# Patient Record
Sex: FEMALE | Race: WHITE | Employment: FULL TIME | URBAN - METROPOLITAN AREA
[De-identification: names, ages, dates, MRNs, and addresses within clinical notes are randomized per-mention and may not be internally consistent; named-entity substitution may affect disease eponyms.]

---

## 2017-01-25 ENCOUNTER — TELEPHONE (OUTPATIENT)
Dept: OBGYN CLINIC | Facility: CLINIC | Age: 42
End: 2017-01-25

## 2017-01-25 PROBLEM — N87.9 CERVICAL DYSPLASIA: Status: ACTIVE | Noted: 2017-01-25

## 2017-01-25 NOTE — TELEPHONE ENCOUNTER
PER PT STATE SHE HAS SURGERY SCHEDULE FOR TOMORROW / PT STATE SHE'S SPOTTING / WANT TO KNOW IF THAT'S OK / PLEASE ADVISE

## 2017-01-25 NOTE — TELEPHONE ENCOUNTER
PT CALLING BACK STTS SHE MIGHT HAVE TO CANCEL HER SURGERY FOR TOMORROW , AS THE CONDITION IS GETTING WORSE , PLS

## 2017-01-25 NOTE — TELEPHONE ENCOUNTER
STATES SHE HAS BEEN SPOTTING FOR 5 DAYS. ONLY NEEDS A PANTY LINER. STATES SHE HAS BEEN BLEEDING FOR A FULL MONTH EVERY OTHER MONTH BUT IS SPOTTING THIS MONTH. ADVISED TO CALL TOMORROW MORNING BY 8:30AM WITH REPORT.   SHE IS AWARE IF BLEEDING ENOUGH TO US

## 2017-01-26 NOTE — TELEPHONE ENCOUNTER
C/O SPOTTING IS INCREASING AND IS NO LONGER COMFORTABLE WITH PANTY LINER ONLY. PASSED A FEW SMALL CLOTS YESTERDAY ALSO BUT NONE YET TODAY. WILL CHECK WITH JLK ABOUT SURGERY TODAY.

## 2017-01-26 NOTE — TELEPHONE ENCOUNTER
REVIEWED WITH CATRACHITO OVER THE PHONE AND SHE WOULD LIKE THIS CASE RESCHEDULED.  [facility]'S CHOICES ARE 2-7 (FIRST CASE), 2-14 (FIRST CASE) OR 2-16 IN THE AFTERNOON. PRE-OP RECOVERY NOTIFIED CASE CANCELLED FOR TODAY.   PT IS AWARE WE CANCELLED AND WILL CALL HER BACK

## 2017-02-07 ENCOUNTER — ANESTHESIA (OUTPATIENT)
Dept: SURGERY | Facility: HOSPITAL | Age: 42
End: 2017-02-07
Payer: COMMERCIAL

## 2017-02-07 ENCOUNTER — HOSPITAL ENCOUNTER (OUTPATIENT)
Facility: HOSPITAL | Age: 42
Setting detail: HOSPITAL OUTPATIENT SURGERY
Discharge: HOME | End: 2017-02-07
Attending: OBSTETRICS & GYNECOLOGY | Admitting: OBSTETRICS & GYNECOLOGY
Payer: COMMERCIAL

## 2017-02-07 ENCOUNTER — ANESTHESIA EVENT (OUTPATIENT)
Dept: SURGERY | Facility: HOSPITAL | Age: 42
End: 2017-02-07
Payer: COMMERCIAL

## 2017-02-07 ENCOUNTER — SURGERY (OUTPATIENT)
Age: 42
End: 2017-02-07

## 2017-02-07 VITALS
DIASTOLIC BLOOD PRESSURE: 76 MMHG | HEIGHT: 64 IN | WEIGHT: 206 LBS | RESPIRATION RATE: 16 BRPM | SYSTOLIC BLOOD PRESSURE: 114 MMHG | TEMPERATURE: 98 F | OXYGEN SATURATION: 97 % | BODY MASS INDEX: 35.17 KG/M2 | HEART RATE: 65 BPM

## 2017-02-07 DIAGNOSIS — D06.9 SEVERE CERVICAL DYSPLASIA: Primary | ICD-10-CM

## 2017-02-07 PROCEDURE — 57520 CONIZATION OF CERVIX: CPT | Performed by: OBSTETRICS & GYNECOLOGY

## 2017-02-07 PROCEDURE — 0UBC7ZX EXCISION OF CERVIX, VIA NATURAL OR ARTIFICIAL OPENING, DIAGNOSTIC: ICD-10-PCS | Performed by: OBSTETRICS & GYNECOLOGY

## 2017-02-07 RX ORDER — ONDANSETRON 4 MG/1
4 TABLET, FILM COATED ORAL EVERY 8 HOURS PRN
Status: CANCELLED | OUTPATIENT
Start: 2017-02-07

## 2017-02-07 RX ORDER — MIDAZOLAM HYDROCHLORIDE 1 MG/ML
INJECTION INTRAMUSCULAR; INTRAVENOUS AS NEEDED
Status: DISCONTINUED | OUTPATIENT
Start: 2017-02-07 | End: 2017-02-07 | Stop reason: SURG

## 2017-02-07 RX ORDER — DEXTROSE, SODIUM CHLORIDE, SODIUM LACTATE, POTASSIUM CHLORIDE, AND CALCIUM CHLORIDE 5; .6; .31; .03; .02 G/100ML; G/100ML; G/100ML; G/100ML; G/100ML
INJECTION, SOLUTION INTRAVENOUS CONTINUOUS
Status: CANCELLED | OUTPATIENT
Start: 2017-02-07

## 2017-02-07 RX ORDER — HYDROCODONE BITARTRATE AND ACETAMINOPHEN 5; 325 MG/1; MG/1
1 TABLET ORAL AS NEEDED
Status: COMPLETED | OUTPATIENT
Start: 2017-02-07 | End: 2017-02-07

## 2017-02-07 RX ORDER — IBUPROFEN 600 MG/1
600 TABLET ORAL EVERY 6 HOURS PRN
Status: DISCONTINUED | OUTPATIENT
Start: 2017-02-07 | End: 2017-02-07

## 2017-02-07 RX ORDER — SODIUM CHLORIDE, SODIUM LACTATE, POTASSIUM CHLORIDE, CALCIUM CHLORIDE 600; 310; 30; 20 MG/100ML; MG/100ML; MG/100ML; MG/100ML
INJECTION, SOLUTION INTRAVENOUS CONTINUOUS
Status: DISCONTINUED | OUTPATIENT
Start: 2017-02-07 | End: 2017-02-07

## 2017-02-07 RX ORDER — IODINE SOLUTION STRONG 5% (LUGOL'S) 5 %
SOLUTION ORAL AS NEEDED
Status: DISCONTINUED | OUTPATIENT
Start: 2017-02-07 | End: 2017-02-07 | Stop reason: HOSPADM

## 2017-02-07 RX ORDER — HYDROMORPHONE HYDROCHLORIDE 1 MG/ML
0.2 INJECTION, SOLUTION INTRAMUSCULAR; INTRAVENOUS; SUBCUTANEOUS EVERY 5 MIN PRN
Status: DISCONTINUED | OUTPATIENT
Start: 2017-02-07 | End: 2017-02-07

## 2017-02-07 RX ORDER — MORPHINE SULFATE 10 MG/ML
6 INJECTION, SOLUTION INTRAMUSCULAR; INTRAVENOUS EVERY 10 MIN PRN
Status: DISCONTINUED | OUTPATIENT
Start: 2017-02-07 | End: 2017-02-07

## 2017-02-07 RX ORDER — FAMOTIDINE 20 MG/1
20 TABLET ORAL ONCE
Status: COMPLETED | OUTPATIENT
Start: 2017-02-07 | End: 2017-02-07

## 2017-02-07 RX ORDER — LIDOCAINE HYDROCHLORIDE 10 MG/ML
INJECTION, SOLUTION EPIDURAL; INFILTRATION; INTRACAUDAL; PERINEURAL AS NEEDED
Status: DISCONTINUED | OUTPATIENT
Start: 2017-02-07 | End: 2017-02-07 | Stop reason: SURG

## 2017-02-07 RX ORDER — MORPHINE SULFATE 4 MG/ML
4 INJECTION, SOLUTION INTRAMUSCULAR; INTRAVENOUS EVERY 10 MIN PRN
Status: DISCONTINUED | OUTPATIENT
Start: 2017-02-07 | End: 2017-02-07

## 2017-02-07 RX ORDER — HYDROCODONE BITARTRATE AND ACETAMINOPHEN 5; 325 MG/1; MG/1
1 TABLET ORAL EVERY 4 HOURS PRN
Status: DISCONTINUED | OUTPATIENT
Start: 2017-02-07 | End: 2017-02-07

## 2017-02-07 RX ORDER — ONDANSETRON 2 MG/ML
4 INJECTION INTRAMUSCULAR; INTRAVENOUS ONCE AS NEEDED
Status: DISCONTINUED | OUTPATIENT
Start: 2017-02-07 | End: 2017-02-07

## 2017-02-07 RX ORDER — FERRIC SUBSULFATE 20-22G/100
SOLUTION, NON-ORAL MISCELLANEOUS AS NEEDED
Status: DISCONTINUED | OUTPATIENT
Start: 2017-02-07 | End: 2017-02-07 | Stop reason: HOSPADM

## 2017-02-07 RX ORDER — ONDANSETRON 2 MG/ML
INJECTION INTRAMUSCULAR; INTRAVENOUS AS NEEDED
Status: DISCONTINUED | OUTPATIENT
Start: 2017-02-07 | End: 2017-02-07 | Stop reason: SURG

## 2017-02-07 RX ORDER — METOCLOPRAMIDE 10 MG/1
10 TABLET ORAL ONCE
Status: COMPLETED | OUTPATIENT
Start: 2017-02-07 | End: 2017-02-07

## 2017-02-07 RX ORDER — HALOPERIDOL 5 MG/ML
0.25 INJECTION INTRAMUSCULAR ONCE AS NEEDED
Status: DISCONTINUED | OUTPATIENT
Start: 2017-02-07 | End: 2017-02-07

## 2017-02-07 RX ORDER — ACETIC ACID 0.25 G/100ML
IRRIGANT IRRIGATION AS NEEDED
Status: DISCONTINUED | OUTPATIENT
Start: 2017-02-07 | End: 2017-02-07 | Stop reason: HOSPADM

## 2017-02-07 RX ORDER — MORPHINE SULFATE 2 MG/ML
2 INJECTION, SOLUTION INTRAMUSCULAR; INTRAVENOUS EVERY 10 MIN PRN
Status: DISCONTINUED | OUTPATIENT
Start: 2017-02-07 | End: 2017-02-07

## 2017-02-07 RX ORDER — DEXAMETHASONE SODIUM PHOSPHATE 4 MG/ML
VIAL (ML) INJECTION AS NEEDED
Status: DISCONTINUED | OUTPATIENT
Start: 2017-02-07 | End: 2017-02-07 | Stop reason: SURG

## 2017-02-07 RX ORDER — LIDOCAINE HYDROCHLORIDE AND EPINEPHRINE 10; 10 MG/ML; UG/ML
INJECTION, SOLUTION INFILTRATION; PERINEURAL AS NEEDED
Status: DISCONTINUED | OUTPATIENT
Start: 2017-02-07 | End: 2017-02-07 | Stop reason: HOSPADM

## 2017-02-07 RX ORDER — NALOXONE HYDROCHLORIDE 0.4 MG/ML
80 INJECTION, SOLUTION INTRAMUSCULAR; INTRAVENOUS; SUBCUTANEOUS AS NEEDED
Status: DISCONTINUED | OUTPATIENT
Start: 2017-02-07 | End: 2017-02-07

## 2017-02-07 RX ORDER — HYDROMORPHONE HYDROCHLORIDE 1 MG/ML
0.6 INJECTION, SOLUTION INTRAMUSCULAR; INTRAVENOUS; SUBCUTANEOUS EVERY 5 MIN PRN
Status: DISCONTINUED | OUTPATIENT
Start: 2017-02-07 | End: 2017-02-07

## 2017-02-07 RX ORDER — HYDROMORPHONE HYDROCHLORIDE 1 MG/ML
0.4 INJECTION, SOLUTION INTRAMUSCULAR; INTRAVENOUS; SUBCUTANEOUS EVERY 5 MIN PRN
Status: DISCONTINUED | OUTPATIENT
Start: 2017-02-07 | End: 2017-02-07

## 2017-02-07 RX ORDER — ONDANSETRON 2 MG/ML
4 INJECTION INTRAMUSCULAR; INTRAVENOUS EVERY 8 HOURS PRN
Status: CANCELLED | OUTPATIENT
Start: 2017-02-07

## 2017-02-07 RX ORDER — KETOROLAC TROMETHAMINE 30 MG/ML
INJECTION, SOLUTION INTRAMUSCULAR; INTRAVENOUS AS NEEDED
Status: DISCONTINUED | OUTPATIENT
Start: 2017-02-07 | End: 2017-02-07 | Stop reason: SURG

## 2017-02-07 RX ORDER — HYDROCODONE BITARTRATE AND ACETAMINOPHEN 5; 325 MG/1; MG/1
2 TABLET ORAL AS NEEDED
Status: COMPLETED | OUTPATIENT
Start: 2017-02-07 | End: 2017-02-07

## 2017-02-07 RX ORDER — ACETAMINOPHEN 325 MG/1
650 TABLET ORAL ONCE
Status: COMPLETED | OUTPATIENT
Start: 2017-02-07 | End: 2017-02-07

## 2017-02-07 RX ADMIN — ONDANSETRON 4 MG: 2 INJECTION INTRAMUSCULAR; INTRAVENOUS at 10:03:00

## 2017-02-07 RX ADMIN — SODIUM CHLORIDE, SODIUM LACTATE, POTASSIUM CHLORIDE, CALCIUM CHLORIDE: 600; 310; 30; 20 INJECTION, SOLUTION INTRAVENOUS at 11:00:00

## 2017-02-07 RX ADMIN — SODIUM CHLORIDE, SODIUM LACTATE, POTASSIUM CHLORIDE, CALCIUM CHLORIDE: 600; 310; 30; 20 INJECTION, SOLUTION INTRAVENOUS at 09:56:00

## 2017-02-07 RX ADMIN — LIDOCAINE HYDROCHLORIDE 50 MG: 10 INJECTION, SOLUTION EPIDURAL; INFILTRATION; INTRACAUDAL; PERINEURAL at 09:57:00

## 2017-02-07 RX ADMIN — DEXAMETHASONE SODIUM PHOSPHATE 4 MG: 4 MG/ML VIAL (ML) INJECTION at 10:03:00

## 2017-02-07 RX ADMIN — SODIUM CHLORIDE, SODIUM LACTATE, POTASSIUM CHLORIDE, CALCIUM CHLORIDE: 600; 310; 30; 20 INJECTION, SOLUTION INTRAVENOUS at 10:45:00

## 2017-02-07 RX ADMIN — MIDAZOLAM HYDROCHLORIDE 2 MG: 1 INJECTION INTRAMUSCULAR; INTRAVENOUS at 09:56:00

## 2017-02-07 RX ADMIN — KETOROLAC TROMETHAMINE 30 MG: 30 INJECTION, SOLUTION INTRAMUSCULAR; INTRAVENOUS at 10:44:00

## 2017-02-07 NOTE — INTERVAL H&P NOTE
Pre-op Diagnosis: Severe cervical dysplasia     The above referenced H&P was reviewed by Tameka Bernard MD on 2/7/2017, the patient was examined and no significant changes have occurred in the patient's condition since the H&P was performed.   I discussed with

## 2017-02-07 NOTE — ANESTHESIA PREPROCEDURE EVALUATION
Anesthesia PreOp Note    HPI:     Jesse Hand is a 39year old female who presents for preoperative consultation requested by: Chad Almanza MD    Date of Surgery: 2/7/2017    Procedure(s):  CERVICAL CONE BIOPSY  Indication: Severe cervical dysplasia [OTHER] Paternal Grandfather 61     pancreatic       Social History   Marital Status: Single  Spouse Name: N/A    Years of Education: N/A  Number of Children: N/A     Occupational History  None on file     Social History Main Topics   Smoking status: Smoke have informed Robina Nohemi  of the nature of the anesthetic plan, benefits, risks, major complications, and any alternative forms of anesthetic management. All of the patient's questions were answered to the best of my ability.  The patient desires th

## 2017-02-07 NOTE — H&P
Pre-Operative History and Physical        HPI:  Cory Moeller is a 39year old  Patient's last menstrual period was 2017.  who presents for cold knife conization.  Had pap in 10/2016-- HGSIL with positive HPV.  Had colposcopy on 2016 [facility] benefits, and alternatives discussed with the patient.  Risks including, but not limited to bleeding, infection, anesth risks were reviewed.  All questions were answered.

## 2017-02-07 NOTE — DISCHARGE SUMMARY
[facility]    Outpatient Surgery Discharge Summary    Dougie Gilliland Patient Status:  Hospital Outpatient Surgery    1975 MRN [id number]   Location [facility] UNIT Attending Beck Nava MD   [address] [facility]

## 2017-02-07 NOTE — BRIEF OP NOTE
[facility] POST ANESTHESIA CARE UNIT  Brief Op Note     Nancy Mccarthy Location: OR   [facility] [id number] MRN [id number]   Admission Date 2/7/2017 Operation Date 2/7/2017   Attending Physician Ernestine Garrett MD Operating Physician Lisa Velasquez MD       Pre-Op

## 2017-02-07 NOTE — ANESTHESIA POSTPROCEDURE EVALUATION
Patient: Siddharth Osorio    Procedure Summary     Date Anesthesia Start Anesthesia Stop Room / Location    02/07/17 0956  [facility] MAIN OR 01 / [facility] MAIN OR       Procedure Diagnosis Surgeon Responsible Provider    CERVICAL CONE BIOPSY (N/A Vagina ) (Severe cer

## 2017-02-07 NOTE — H&P
Pre-Operative History and Physical        HPI:  Dougie Gilliland is a 39year old  Patient's last menstrual period was 2017.  who presents for cold knife conization.  Had pap in 10/2016-- HGSIL with positive HPV.  Had colposcopy on 2016 [facility] hair distribution, and no lesions  Urethral Meatus:  normal in size, location, without lesions and prolapse  Bladder:  No fullness, masses or tenderness  Vagina:  Normal appearance without lesions, no abnormal discharge  Cervix:  Normal without tenderness

## 2017-02-08 NOTE — OPERATIVE REPORT
[facility]    PATIENT'S NAME: Melany Patel   ATTENDING PHYSICIAN: Maylin Ford MD   OPERATING PHYSICIAN: Maylin Ford MD   PATIENT ACCOUNT#:   [de-identified]    LOCATION:  [facility] 10  MEDICAL RECORD #:   [id number]       DATE OF BIRTH MD  d: 02/07/2017 11:26:12  t: 02/07/2017 18:56:52  [facility] [id number]  CATRACHITO/

## 2017-02-14 ENCOUNTER — OFFICE VISIT (OUTPATIENT)
Dept: OBGYN CLINIC | Facility: CLINIC | Age: 42
End: 2017-02-14

## 2017-02-14 VITALS
WEIGHT: 208 LBS | SYSTOLIC BLOOD PRESSURE: 112 MMHG | BODY MASS INDEX: 36 KG/M2 | DIASTOLIC BLOOD PRESSURE: 76 MMHG | HEART RATE: 69 BPM

## 2017-02-14 DIAGNOSIS — Z98.890 POST-OPERATIVE STATE: Primary | ICD-10-CM

## 2017-02-14 PROCEDURE — 99024 POSTOP FOLLOW-UP VISIT: CPT | Performed by: OBSTETRICS & GYNECOLOGY

## 2017-02-14 NOTE — PROGRESS NOTES
Laurita Araya is a 39year old female [id number] Patient's last menstrual period was 01/06/2017. Patient presents with:  Gyn Problem: post-op f/u, cone biopsy    Here for post op exam.   Had cold knife cervical conization on 2/7/17.   Final pathology-- DARSHAN

## 2017-06-12 ENCOUNTER — TELEPHONE (OUTPATIENT)
Dept: OBGYN CLINIC | Facility: CLINIC | Age: 42
End: 2017-06-12

## 2017-06-12 NOTE — TELEPHONE ENCOUNTER
LMTCB. 11/2016 CARMENK stated that addl views and u/s - small breast cysts. Repeat left diag mamm in 6 months. It was due 5/2017. Need to know if pt is going to schedule it.

## 2017-06-26 NOTE — TELEPHONE ENCOUNTER
Pt advised of below and # for central scheduling given. Pt has no further questions, and states understanding.

## 2017-10-20 ENCOUNTER — OFFICE VISIT (OUTPATIENT)
Dept: OBGYN CLINIC | Facility: CLINIC | Age: 42
End: 2017-10-20

## 2017-10-20 VITALS
HEIGHT: 63.5 IN | SYSTOLIC BLOOD PRESSURE: 111 MMHG | WEIGHT: 221 LBS | DIASTOLIC BLOOD PRESSURE: 76 MMHG | HEART RATE: 69 BPM | BODY MASS INDEX: 38.67 KG/M2

## 2017-10-20 DIAGNOSIS — Z01.419 ENCOUNTER FOR GYNECOLOGICAL EXAMINATION: ICD-10-CM

## 2017-10-20 DIAGNOSIS — N92.6 PROLONGED PERIODS: Primary | ICD-10-CM

## 2017-10-20 DIAGNOSIS — N60.02 CYST OF LEFT BREAST: ICD-10-CM

## 2017-10-20 DIAGNOSIS — Z12.4 SCREENING FOR MALIGNANT NEOPLASM OF CERVIX: ICD-10-CM

## 2017-10-20 PROCEDURE — 99396 PREV VISIT EST AGE 40-64: CPT | Performed by: OBSTETRICS & GYNECOLOGY

## 2017-10-20 NOTE — PROGRESS NOTES
Patience Perez is a 43year old female [id number] Patient's last menstrual period was 10/04/2017. here for annual exam.       Last seen 2/14/17. Had cold knife cervical conization on 2/7/17.   Final pathology-- DARSHAN 2-3 with clear ectocervical and endocervic fatigue, night sweats, hot flashes  Eyes:  denies blurred or double vision  Cardiovascular:  denies chest pain or palpitations  Respiratory:  denies shortness of breath  Gastrointestinal:  denies heartburn, abdominal pain, diarrhea or constipation  Genitou Annual exams encouraged. RTC 1 year or prn    2. Prolonged periods  Order for pelvic u/s. Will also need e-bx.     3. Cyst of left breast  Order for bilateral mammogram.    4.  Cold knife conization of cervix 2/2017      No prescriptions requested or ord

## 2017-11-03 ENCOUNTER — HOSPITAL ENCOUNTER (OUTPATIENT)
Dept: MAMMOGRAPHY | Facility: HOSPITAL | Age: 42
Discharge: HOME | End: 2017-11-03
Attending: OBSTETRICS & GYNECOLOGY
Payer: COMMERCIAL

## 2017-11-03 ENCOUNTER — HOSPITAL ENCOUNTER (OUTPATIENT)
Dept: ULTRASOUND IMAGING | Facility: HOSPITAL | Age: 42
Discharge: HOME | End: 2017-11-03
Attending: OBSTETRICS & GYNECOLOGY
Payer: COMMERCIAL

## 2017-11-03 DIAGNOSIS — N92.6 PROLONGED PERIODS: ICD-10-CM

## 2017-11-03 DIAGNOSIS — N60.02 CYST OF LEFT BREAST: ICD-10-CM

## 2017-11-03 PROCEDURE — 93975 VASCULAR STUDY: CPT | Performed by: OBSTETRICS & GYNECOLOGY

## 2017-11-03 PROCEDURE — 77066 DX MAMMO INCL CAD BI: CPT | Performed by: OBSTETRICS & GYNECOLOGY

## 2017-11-03 PROCEDURE — 76830 TRANSVAGINAL US NON-OB: CPT | Performed by: OBSTETRICS & GYNECOLOGY

## 2017-11-03 PROCEDURE — 76856 US EXAM PELVIC COMPLETE: CPT | Performed by: OBSTETRICS & GYNECOLOGY

## 2017-11-10 ENCOUNTER — TELEPHONE (OUTPATIENT)
Dept: OBGYN CLINIC | Facility: CLINIC | Age: 42
End: 2017-11-10

## 2017-11-14 NOTE — TELEPHONE ENCOUNTER
Sent message via My Chart regarding pelvic u/s-- fibroids.   Needs e-bx if bleeding continues to be abnormal.

## 2017-11-29 ENCOUNTER — TELEPHONE (OUTPATIENT)
Dept: OBGYN CLINIC | Facility: CLINIC | Age: 42
End: 2017-11-29

## 2017-11-29 NOTE — TELEPHONE ENCOUNTER
Pt informed of below and verbalized understanding. Today is day 1, she will call if bleeding persists for 7 days.

## 2017-11-29 NOTE — TELEPHONE ENCOUNTER
Ade Moran MD      3:33 PM   Note      Sent message via My Chart regarding pelvic u/s-- fibroids.   Needs e-bx if bleeding continues to be abnormal.          Notes Recorded by Ade Moran MD on 10/27/2017 at 2:41 PM CDT  Normal pap and negative HPV.  But r

## 2018-05-16 ENCOUNTER — TELEPHONE (OUTPATIENT)
Dept: FAMILY MEDICINE CLINIC | Facility: CLINIC | Age: 43
End: 2018-05-16

## 2018-05-16 NOTE — TELEPHONE ENCOUNTER
Pt called in requesting to have follow up lab orders placed on to her chart for the following:    Lipid Panel   Thyroid   Comp Metabolic Panel

## 2018-05-17 ENCOUNTER — OFFICE VISIT (OUTPATIENT)
Dept: FAMILY MEDICINE CLINIC | Facility: CLINIC | Age: 43
End: 2018-05-17

## 2018-05-17 VITALS
WEIGHT: 237 LBS | HEART RATE: 87 BPM | RESPIRATION RATE: 20 BRPM | TEMPERATURE: 98 F | BODY MASS INDEX: 40.46 KG/M2 | DIASTOLIC BLOOD PRESSURE: 75 MMHG | SYSTOLIC BLOOD PRESSURE: 116 MMHG | HEIGHT: 64 IN

## 2018-05-17 DIAGNOSIS — Z00.00 ROUTINE PHYSICAL EXAMINATION: Primary | ICD-10-CM

## 2018-05-17 PROCEDURE — 99396 PREV VISIT EST AGE 40-64: CPT | Performed by: FAMILY MEDICINE

## 2018-05-17 NOTE — TELEPHONE ENCOUNTER
Patient has never been seen by [facility]. Appt will be needed to establish care for a px. NINA, please schedule an appt for patient. Orders will be given at the time of appt.

## 2018-05-17 NOTE — PROGRESS NOTES
HPI:    Patient ID: Marcelo Oquendo is a 37year old female. Patient is here for routine physical exam. No acute issues. No significant chronic medical problems. Patient is requesting blood testing.  Diet and exercise have been fair Past medical history Psychiatric: She has a normal mood and affect. Her behavior is normal. Judgment and thought content normal.   Vitals reviewed. ASSESSMENT/PLAN:   Routine physical examination: with some recent fatigue:  - Exam is unremarkable.  Screening tests

## 2018-05-19 ENCOUNTER — APPOINTMENT (OUTPATIENT)
Dept: LAB | Facility: HOSPITAL | Age: 43
End: 2018-05-19
Attending: FAMILY MEDICINE
Payer: COMMERCIAL

## 2018-05-19 DIAGNOSIS — Z00.00 ROUTINE PHYSICAL EXAMINATION: ICD-10-CM

## 2018-05-19 PROCEDURE — 84443 ASSAY THYROID STIM HORMONE: CPT

## 2018-05-19 PROCEDURE — 36415 COLL VENOUS BLD VENIPUNCTURE: CPT

## 2018-05-19 PROCEDURE — 80053 COMPREHEN METABOLIC PANEL: CPT

## 2018-05-19 PROCEDURE — 80061 LIPID PANEL: CPT

## 2018-05-19 PROCEDURE — 85027 COMPLETE CBC AUTOMATED: CPT

## 2019-03-04 ENCOUNTER — HOSPITAL ENCOUNTER (OUTPATIENT)
Dept: GENERAL RADIOLOGY | Age: 44
Discharge: HOME | End: 2019-03-04
Attending: FAMILY MEDICINE
Payer: COMMERCIAL

## 2019-03-04 ENCOUNTER — OFFICE VISIT (OUTPATIENT)
Dept: FAMILY MEDICINE CLINIC | Facility: CLINIC | Age: 44
End: 2019-03-04
Payer: COMMERCIAL

## 2019-03-04 VITALS
SYSTOLIC BLOOD PRESSURE: 125 MMHG | HEART RATE: 78 BPM | BODY MASS INDEX: 41.66 KG/M2 | WEIGHT: 241 LBS | RESPIRATION RATE: 18 BRPM | DIASTOLIC BLOOD PRESSURE: 88 MMHG | HEIGHT: 63.9 IN | TEMPERATURE: 98 F

## 2019-03-04 DIAGNOSIS — M25.552 PAIN OF BOTH HIP JOINTS: ICD-10-CM

## 2019-03-04 DIAGNOSIS — M25.551 PAIN OF BOTH HIP JOINTS: ICD-10-CM

## 2019-03-04 PROCEDURE — 99212 OFFICE O/P EST SF 10 MIN: CPT | Performed by: FAMILY MEDICINE

## 2019-03-04 PROCEDURE — 99213 OFFICE O/P EST LOW 20 MIN: CPT | Performed by: FAMILY MEDICINE

## 2019-03-04 PROCEDURE — 73502 X-RAY EXAM HIP UNI 2-3 VIEWS: CPT | Performed by: FAMILY MEDICINE

## 2019-03-04 RX ORDER — IBUPROFEN 200 MG
400 TABLET ORAL EVERY 6 HOURS PRN
COMMUNITY
End: 2019-05-28

## 2019-03-04 NOTE — PROGRESS NOTES
HPI:    Patient ID: Laurita Araya is a 40year old female. Pt presents with pain of the hips - worse on the right side. Pt has had intermittent symptoms for 6 months and worse over the last 2 months. No injury or trauma.  Pt has pain of the anterior a

## 2019-03-13 ENCOUNTER — OFFICE VISIT (OUTPATIENT)
Dept: NEUROLOGY | Facility: CLINIC | Age: 44
End: 2019-03-13
Payer: COMMERCIAL

## 2019-03-13 ENCOUNTER — TELEPHONE (OUTPATIENT)
Dept: NEUROLOGY | Facility: CLINIC | Age: 44
End: 2019-03-13

## 2019-03-13 VITALS
SYSTOLIC BLOOD PRESSURE: 116 MMHG | DIASTOLIC BLOOD PRESSURE: 78 MMHG | HEART RATE: 88 BPM | WEIGHT: 240 LBS | HEIGHT: 64 IN | BODY MASS INDEX: 40.97 KG/M2

## 2019-03-13 DIAGNOSIS — R29.3 POOR POSTURE: ICD-10-CM

## 2019-03-13 DIAGNOSIS — M16.0 PRIMARY OSTEOARTHRITIS OF BOTH HIPS: Primary | ICD-10-CM

## 2019-03-13 DIAGNOSIS — E66.01 CLASS 3 SEVERE OBESITY WITH BODY MASS INDEX (BMI) OF 40.0 TO 44.9 IN ADULT, UNSPECIFIED OBESITY TYPE, UNSPECIFIED WHETHER SERIOUS COMORBIDITY PRESENT (HCC): ICD-10-CM

## 2019-03-13 DIAGNOSIS — G47.9 SLEEP DISTURBANCE: ICD-10-CM

## 2019-03-13 DIAGNOSIS — M62.9 HAMSTRING TIGHTNESS OF BOTH LOWER EXTREMITIES: ICD-10-CM

## 2019-03-13 PROBLEM — E66.813 CLASS 3 SEVERE OBESITY WITH BODY MASS INDEX (BMI) OF 40.0 TO 44.9 IN ADULT (HCC): Status: ACTIVE | Noted: 2019-03-13

## 2019-03-13 PROBLEM — E66.813 CLASS 3 SEVERE OBESITY WITH BODY MASS INDEX (BMI) OF 40.0 TO 44.9 IN ADULT: Status: ACTIVE | Noted: 2019-03-13

## 2019-03-13 PROCEDURE — 99244 OFF/OP CNSLTJ NEW/EST MOD 40: CPT | Performed by: PHYSICAL MEDICINE & REHABILITATION

## 2019-03-13 RX ORDER — MELOXICAM 15 MG/1
15 TABLET ORAL DAILY
Qty: 14 TABLET | Refills: 0 | Status: SHIPPED | OUTPATIENT
Start: 2019-03-13 | End: 2019-03-27

## 2019-03-13 NOTE — TELEPHONE ENCOUNTER
Called [facility] BS for authorization of approval of Ultrasound guided CSI bilateral hips cpt codes 12077, [id number], . Talked to Sherine Drummond. who states no authorization is required. Reference # [id number]. Will inform Nursing.

## 2019-03-13 NOTE — PATIENT INSTRUCTIONS
-Start physical therapy  -Home exercises as advised by PT  -Mobic daily for the next 2 weeks and then as needed  -Dietician consult for weight management  -Follow up with me in 3 weeks  -If no better will consider hip joint injections

## 2019-03-13 NOTE — PROGRESS NOTES
[address] [facility]  NEW PATIENT EVALUATION    Consultation as a request of Dr. Caden Chowdhury    Chief Complaint: bilateral hip pain.     HISTORY OF PRESENT ILLNESS:   Patient presents with:  Hip Pain: Patient presents today c/ Ibuprofen    Work Related: No  Occupation:full time job doing   Activity level: Likes to golf and walk daily        PAST MEDICAL HISTORY:     Past Medical History:   Diagnosis Date   • PONV (postoperative nausea and vomiting)    • Torn men pain, dyspnea, exertional chest pressure/discomfort, orthopnea and paroxysmal nocturnal dyspnea  Gastrointestinal: negative for abdominal pain, constipation and diarrhea  Genitourinary:negative for dysuria, frequency and urinary incontinence  Hematologic/l LUMBAR SPINE:  Inspection: no erythema, swelling, or obvious deformity.   Their iliac crest and shoulder heights are symmetrical.     Palpation: No TTP spinous process, paraspinal muscles, SI joints, prirformis muscle, glut's, greater trochanter bursa 1. Primary osteoarthritis of both hips    2. Poor posture    3. Class 3 severe obesity with body mass index (BMI) of 40.0 to 44.9 in adult, unspecified obesity type, unspecified whether serious comorbidity present ([address]    4.  Hamstring tightness of bot

## 2019-03-20 ENCOUNTER — MED REC SCAN ONLY (OUTPATIENT)
Dept: NEUROLOGY | Facility: CLINIC | Age: 44
End: 2019-03-20

## 2019-04-03 ENCOUNTER — OFFICE VISIT (OUTPATIENT)
Dept: NEUROLOGY | Facility: CLINIC | Age: 44
End: 2019-04-03
Payer: COMMERCIAL

## 2019-04-03 VITALS
HEART RATE: 88 BPM | DIASTOLIC BLOOD PRESSURE: 74 MMHG | HEIGHT: 64 IN | BODY MASS INDEX: 40.12 KG/M2 | SYSTOLIC BLOOD PRESSURE: 116 MMHG | WEIGHT: 235 LBS

## 2019-04-03 DIAGNOSIS — R29.3 POOR POSTURE: ICD-10-CM

## 2019-04-03 DIAGNOSIS — E66.01 CLASS 3 SEVERE OBESITY WITH BODY MASS INDEX (BMI) OF 40.0 TO 44.9 IN ADULT, UNSPECIFIED OBESITY TYPE, UNSPECIFIED WHETHER SERIOUS COMORBIDITY PRESENT (HCC): ICD-10-CM

## 2019-04-03 DIAGNOSIS — M16.0 PRIMARY OSTEOARTHRITIS OF BOTH HIPS: Primary | ICD-10-CM

## 2019-04-03 DIAGNOSIS — M62.9 HAMSTRING TIGHTNESS OF BOTH LOWER EXTREMITIES: ICD-10-CM

## 2019-04-03 PROCEDURE — 20611 DRAIN/INJ JOINT/BURSA W/US: CPT | Performed by: PHYSICAL MEDICINE & REHABILITATION

## 2019-04-03 PROCEDURE — 99214 OFFICE O/P EST MOD 30 MIN: CPT | Performed by: PHYSICAL MEDICINE & REHABILITATION

## 2019-04-03 RX ORDER — TRIAMCINOLONE ACETONIDE 40 MG/ML
40 INJECTION, SUSPENSION INTRA-ARTICULAR; INTRAMUSCULAR ONCE
Status: COMPLETED | OUTPATIENT
Start: 2019-04-03 | End: 2019-04-03

## 2019-04-03 RX ORDER — LIDOCAINE HYDROCHLORIDE 10 MG/ML
4 INJECTION, SOLUTION INFILTRATION; PERINEURAL ONCE
Status: COMPLETED | OUTPATIENT
Start: 2019-04-03 | End: 2019-04-03

## 2019-04-03 NOTE — PROGRESS NOTES
[address] [facility]  NEW PATIENT EVALUATION    Chief Complaint: bilateral hip pain. HISTORY OF PRESENT ILLNESS:   Patient presents with:  Hip Pain: LOV: 3/13/19. F/U on bilateral hip pain.  Patient states that she murphy         CURRENT MEDICATIONS:       Current Outpatient Medications:  ibuprofen (ADVIL) 200 MG Oral Tab Take 400 mg by mouth every 6 (six) hours as needed for Pain.  Disp:  Rfl:          ALLERGIES:   No Known Allergies      FAMILY HISTORY:     [place] Patient Position: Sitting, Cuff Size: adult)   Pulse 88   Ht 64\"   Wt 235 lb   BMI 40.34 kg/m²   General: No immediate distress  Head: Normocephalic/ Atraumatic  Eyes: Extra-occular movements intact.    Ears: No auricular hematoma or deformities  Mouth: No 05/19/2018    ALB 3.5 05/19/2018    GLOBULIN 3.1 05/19/2018    AGRATIO 1.4 03/31/2011     05/19/2018    K 4.3 05/19/2018     05/19/2018    CO2 27 05/19/2018     No results found for: PTP, PT, INR  No results found for: VITD, QVITD, VITD25, VITD Completed  Time Out:  Team Confirms the Correct Patient, Correct Procedure, Correct Site and Site Marking, Correct Position (if applicable), Prep and Dry Time (if applicable).   Time:  9:00 AM    Affirmation of Time Out: YES  Sign Out Discussion: Completed

## 2019-04-03 NOTE — PATIENT INSTRUCTIONS
Steroid Injection Information  What to expect: The injection contains Lidocaine (which numbs the area) and Kenalog (a steroid which decreases inflammation). You may have pain relief within hours of the injection due to the Lidocaine.   The Kenalog can take

## 2019-04-16 ENCOUNTER — MED REC SCAN ONLY (OUTPATIENT)
Dept: NEUROLOGY | Facility: CLINIC | Age: 44
End: 2019-04-16

## 2019-04-24 ENCOUNTER — OFFICE VISIT (OUTPATIENT)
Dept: NEUROLOGY | Facility: CLINIC | Age: 44
End: 2019-04-24
Payer: COMMERCIAL

## 2019-04-24 ENCOUNTER — TELEPHONE (OUTPATIENT)
Dept: NEUROLOGY | Facility: CLINIC | Age: 44
End: 2019-04-24

## 2019-04-24 VITALS
DIASTOLIC BLOOD PRESSURE: 90 MMHG | BODY MASS INDEX: 40.12 KG/M2 | HEIGHT: 64 IN | HEART RATE: 84 BPM | WEIGHT: 235 LBS | SYSTOLIC BLOOD PRESSURE: 130 MMHG | RESPIRATION RATE: 18 BRPM

## 2019-04-24 DIAGNOSIS — R29.3 POOR POSTURE: ICD-10-CM

## 2019-04-24 DIAGNOSIS — M16.11 OSTEOARTHRITIS OF RIGHT HIP, UNSPECIFIED OSTEOARTHRITIS TYPE: Primary | ICD-10-CM

## 2019-04-24 DIAGNOSIS — M16.0 PRIMARY OSTEOARTHRITIS OF BOTH HIPS: ICD-10-CM

## 2019-04-24 DIAGNOSIS — G47.9 SLEEP DISTURBANCE: ICD-10-CM

## 2019-04-24 DIAGNOSIS — M62.9 HAMSTRING TIGHTNESS OF BOTH LOWER EXTREMITIES: ICD-10-CM

## 2019-04-24 DIAGNOSIS — E66.01 CLASS 3 SEVERE OBESITY WITH BODY MASS INDEX (BMI) OF 40.0 TO 44.9 IN ADULT, UNSPECIFIED OBESITY TYPE, UNSPECIFIED WHETHER SERIOUS COMORBIDITY PRESENT (HCC): ICD-10-CM

## 2019-04-24 PROCEDURE — 99214 OFFICE O/P EST MOD 30 MIN: CPT | Performed by: PHYSICAL MEDICINE & REHABILITATION

## 2019-04-24 NOTE — PROGRESS NOTES
[address] [facility]  NEW PATIENT EVALUATION    Chief Complaint: bilateral hip pain. HISTORY OF PRESENT ILLNESS:   Patient presents with:  Hip Pain: LOV: 4/3/19 for Right hip injection.  Pt reports complete relief f FOREARM/WRIST SURGERY UNLISTED Left     plate/pins   • KNEE SURGERY Bilateral    • OTHER SURGICAL HISTORY  2017    cold knife cervical conization         CURRENT MEDICATIONS:       Current Outpatient Medications:  ibuprofen (ADVIL) 200 MG Oral Tab Take 400 polydipsia, polyphagia and polyuria  Allergic/Immunologic: negative for urticaria      PHYSICAL EXAM:   /90 (BP Location: Right arm, Patient Position: Sitting, Cuff Size: adult)   Pulse 84   Resp 18   Ht 64\"   Wt 235 lb   BMI 40.34 kg/m²   General: OSMOCALC 291 05/19/2018    ALKPHO 77 05/19/2018    AST 17 05/19/2018    ALT 15 05/19/2018    ALKPHOS 55 03/31/2011    BILT 0.5 05/19/2018    TP 6.6 05/19/2018    ALB 3.5 05/19/2018    GLOBULIN 3.1 05/19/2018    AGRATIO 1.4 03/31/2011     05/19/2018 medicine for weight loss recommendations. I would like for her to continue physical therapy and home exercises at this time as well. Advised patient to follow-up with me after the MRI is completed for further evaluation and treatment.       Shan Patient DO

## 2019-04-24 NOTE — TELEPHONE ENCOUNTER
Called [employer] / [facility]. [facility] for authorization of approval for MRI HIPS, RIGHT cpt code 73843. Spoke to Ramila who states prior authorization is required fax clinical notes to [phone], Reference # Milvia Bowling 04/24/19.

## 2019-04-24 NOTE — PATIENT INSTRUCTIONS
-MRI of the right hip   -Aleve 2 tabs in the morning, 2 at night  -Follow up after MRI is completed for further evaluation and treatment recommendation  -See Integrative medicine for weight loss recommendations  -Continue physical therapy and home exercise

## 2019-05-03 NOTE — TELEPHONE ENCOUNTER
Called [facility] to check on status of MRI right hip wo. T/t Ophelia Rivera  Nurse reviewer. Provided additional clinical information. Approved with Authorization # [id number] effective 05/03/19 to 06/01/19. Will call Pt. To inform.  L/m advising of approval. Can procee

## 2019-05-06 ENCOUNTER — OFFICE VISIT (OUTPATIENT)
Dept: FAMILY MEDICINE CLINIC | Facility: CLINIC | Age: 44
End: 2019-05-06
Payer: COMMERCIAL

## 2019-05-06 VITALS — WEIGHT: 245.63 LBS | BODY MASS INDEX: 42 KG/M2

## 2019-05-06 DIAGNOSIS — E66.01 CLASS 3 SEVERE OBESITY WITH BODY MASS INDEX (BMI) OF 40.0 TO 44.9 IN ADULT, UNSPECIFIED OBESITY TYPE, UNSPECIFIED WHETHER SERIOUS COMORBIDITY PRESENT (HCC): Primary | ICD-10-CM

## 2019-05-06 DIAGNOSIS — M16.0 PRIMARY OSTEOARTHRITIS OF BOTH HIPS: ICD-10-CM

## 2019-05-06 DIAGNOSIS — N87.9 CERVICAL DYSPLASIA: ICD-10-CM

## 2019-05-06 PROCEDURE — 99214 OFFICE O/P EST MOD 30 MIN: CPT | Performed by: PHYSICIAN ASSISTANT

## 2019-05-06 NOTE — PROGRESS NOTES
Martha Patel is a 40year old female. Patient presents with:  Nutrition Counseling      HPI:   Has been on weight watchers and other programs most of her life. She finds them restrictive and she always ends up binging on food. Referred by Dr. Joel Stephenson. FAMILY HISTORY:      Family History   Problem Relation Age of Onset   • Cancer Father 48        pancreatic   • Other (Other) Father 48        pancreatic   • Diabetes Other    • Hypertension Maternal Grandmother    • Other (Other) Maternal Grandmother 80 Attends meetings of clubs or organizations: Not on file        Relationship status: Not on file      Intimate partner violence:        Fear of current or ex partner: Not on file        Emotionally abused: Not on file        Physically abused: Not on Discussed balancing meals with patient by including veggies at every meal and a healthy fat and protein. Discussed limiting sugar to less than 25 g daily. Encouraged to stop soda and cream and sugar in coffee.      Encouraged patient to drink  A liquid supplement for gut health. This is a carbon, soil-based product that helps to rebuild the tight junctions, or important connections between cells, in the intestines.  These tight junctions get compromised by daily stress, reduced immune function an

## 2019-05-06 NOTE — PATIENT INSTRUCTIONS
Proper Hydration with WATER is KEY  The general rule of thumb for optimal water intake is half your body weight in ounces    OR    Enough water to keep your urine very light yellow to clear EVERY time you go to the washroom    OR    At least 64-75 ounces o [email or web address].[email or web address] or at the [employer]. Omega 3 fatty acids are anti-inflammatory and important for brain and nervous system health, including memory. I recommend taking 2000 mg daily.   Some good brands are:  - at [employer] stores: No

## 2019-05-13 ENCOUNTER — TELEPHONE (OUTPATIENT)
Dept: NEUROLOGY | Facility: CLINIC | Age: 44
End: 2019-05-13

## 2019-05-16 ENCOUNTER — MED REC SCAN ONLY (OUTPATIENT)
Dept: NEUROLOGY | Facility: CLINIC | Age: 44
End: 2019-05-16

## 2019-05-28 ENCOUNTER — OFFICE VISIT (OUTPATIENT)
Dept: NEUROLOGY | Facility: CLINIC | Age: 44
End: 2019-05-28
Payer: COMMERCIAL

## 2019-05-28 VITALS
DIASTOLIC BLOOD PRESSURE: 84 MMHG | SYSTOLIC BLOOD PRESSURE: 130 MMHG | HEART RATE: 76 BPM | HEIGHT: 64 IN | BODY MASS INDEX: 40.12 KG/M2 | RESPIRATION RATE: 18 BRPM | WEIGHT: 235 LBS

## 2019-05-28 DIAGNOSIS — M16.11 OSTEOARTHRITIS OF RIGHT HIP, UNSPECIFIED OSTEOARTHRITIS TYPE: Primary | ICD-10-CM

## 2019-05-28 DIAGNOSIS — G47.9 SLEEP DISTURBANCE: ICD-10-CM

## 2019-05-28 DIAGNOSIS — R29.3 POOR POSTURE: ICD-10-CM

## 2019-05-28 DIAGNOSIS — E66.01 CLASS 3 SEVERE OBESITY WITH BODY MASS INDEX (BMI) OF 40.0 TO 44.9 IN ADULT, UNSPECIFIED OBESITY TYPE, UNSPECIFIED WHETHER SERIOUS COMORBIDITY PRESENT (HCC): ICD-10-CM

## 2019-05-28 DIAGNOSIS — M62.9 HAMSTRING TIGHTNESS OF BOTH LOWER EXTREMITIES: ICD-10-CM

## 2019-05-28 PROCEDURE — 99214 OFFICE O/P EST MOD 30 MIN: CPT | Performed by: PHYSICAL MEDICINE & REHABILITATION

## 2019-05-28 RX ORDER — COVID-19 ANTIGEN TEST
220 KIT MISCELLANEOUS
COMMUNITY
End: 2022-02-07

## 2019-05-28 RX ORDER — MELOXICAM 15 MG/1
15 TABLET ORAL DAILY
Qty: 30 TABLET | Refills: 0 | Status: SHIPPED | OUTPATIENT
Start: 2019-05-28 | End: 2019-06-27

## 2019-05-28 NOTE — PATIENT INSTRUCTIONS
-Continue therapy and home exercises  -Mobic for the next 2-3 weeks and then as needed  -Ice/Heat as tolerated  -Follow up in 4 weeks  -if no better will plan for injection

## 2019-05-28 NOTE — PROGRESS NOTES
[address]  NEW PATIENT EVALUATION    Chief Complaint: bilateral hip pain.     HISTORY OF PRESENT ILLNESS:   Patient presents with:  Hip Pain: LOV 04/24/19 Currently in PT stating some days it helps but some UNLISTED Left     plate/pins   • KNEE SURGERY Bilateral    • OTHER SURGICAL HISTORY  2017    cold knife cervical conization         CURRENT MEDICATIONS:       Current Outpatient Medications:  Naproxen Sodium (ALEVE) 220 MG Oral Cap Take 220 mg by mouth.  John Gerard numbness/tingling or weakness   Behavioral/Psych: negative for anxiety and depression  Endocrine: negative for diabetic symptoms including blurry vision, polydipsia, polyphagia and polyuria  Allergic/Immunologic: negative for urticaria      PHYSICAL EXAM: 05/19/2018    GFRNAA >60 05/19/2018    GFRAA >60 05/19/2018    CA 8.8 05/19/2018    OSMOCALC 291 05/19/2018    ALKPHO 77 05/19/2018    AST 17 05/19/2018    ALT 15 05/19/2018    ALKPHOS 55 03/31/2011    BILT 0.5 05/19/2018    TP 6.6 05/19/2018    ALB 3.5 05 medication and home exercises at this time. I prescribed Mobic for the patient today.   Advised her to follow back up with me in 4 to 6 weeks at which time we will reevaluate her symptoms and consider further treatment including a repeat injection with att

## 2019-06-03 ENCOUNTER — OFFICE VISIT (OUTPATIENT)
Dept: FAMILY MEDICINE CLINIC | Facility: CLINIC | Age: 44
End: 2019-06-03
Payer: COMMERCIAL

## 2019-06-03 VITALS — WEIGHT: 245.19 LBS | BODY MASS INDEX: 42 KG/M2

## 2019-06-03 DIAGNOSIS — N87.9 CERVICAL DYSPLASIA: ICD-10-CM

## 2019-06-03 DIAGNOSIS — M16.11 OSTEOARTHRITIS OF RIGHT HIP, UNSPECIFIED OSTEOARTHRITIS TYPE: ICD-10-CM

## 2019-06-03 DIAGNOSIS — E66.01 CLASS 3 SEVERE OBESITY WITH BODY MASS INDEX (BMI) OF 40.0 TO 44.9 IN ADULT, UNSPECIFIED OBESITY TYPE, UNSPECIFIED WHETHER SERIOUS COMORBIDITY PRESENT (HCC): Primary | ICD-10-CM

## 2019-06-03 PROCEDURE — 99214 OFFICE O/P EST MOD 30 MIN: CPT | Performed by: PHYSICIAN ASSISTANT

## 2019-06-03 NOTE — PATIENT INSTRUCTIONS
Intermittent Fasting for 2 -4 weeks  · Work your way up to 16 hours between last meal of the day and first meal of the following day  · Try to have last meal of the day 3 hours prior to bedtime  · If feeling shaky, light headed, or experience any headaches

## 2019-06-03 NOTE — H&P
Weight:  lbs  Circumference Measurements  Chest Waist Hips L arm R arm L thigh R thigh Total   44.5 41.5 46.5 14 14 28.5 28.5

## 2019-06-03 NOTE — PROGRESS NOTES
Janette Saldivar is a 40year old female. Patient presents with:  Nutrition Counseling      HPI:   Eliminated soda. Drinking 100 oz of water. Better quality meat. Still eating out a lot. Struggling with planning meals.  Son is home now and trying to make Current Outpatient Medications:  Naproxen Sodium (ALEVE) 220 MG Oral Cap Take 220 mg by mouth. Disp:  Rfl:    Famotidine (PEPCID AC) 10 MG Oral Chew Tab Chew 10 mg by mouth as needed for Heartburn.  Disp:  Rfl:    Meloxicam (MOBIC) 15 MG Oral Tab Take 1 tab Caffeine Concern: Yes          soda, coffee, 32 oz daily        Occupational Exposure: Not Asked        Hobby Hazards: Not Asked        Sleep Concern: Not Asked        Stress Concern: Not Asked        Weight Concern: Not Asked        Special Diet: No No orders of the defined types were placed in this encounter.       Patient Instructions   Intermittent Fasting for 2 -4 weeks  · Work your way up to 16 hours between last meal of the day and first meal of the following day  · Try to have last meal of the d

## 2019-06-12 ENCOUNTER — MED REC SCAN ONLY (OUTPATIENT)
Dept: NEUROLOGY | Facility: CLINIC | Age: 44
End: 2019-06-12

## 2019-07-08 ENCOUNTER — OFFICE VISIT (OUTPATIENT)
Dept: INTEGRATIVE MEDICINE | Facility: CLINIC | Age: 44
End: 2019-07-08
Payer: COMMERCIAL

## 2019-07-08 VITALS — BODY MASS INDEX: 41 KG/M2 | WEIGHT: 239 LBS

## 2019-07-08 DIAGNOSIS — G47.9 SLEEP DISTURBANCE: ICD-10-CM

## 2019-07-08 DIAGNOSIS — M16.11 OSTEOARTHRITIS OF RIGHT HIP, UNSPECIFIED OSTEOARTHRITIS TYPE: ICD-10-CM

## 2019-07-08 DIAGNOSIS — E66.01 CLASS 3 SEVERE OBESITY WITH BODY MASS INDEX (BMI) OF 40.0 TO 44.9 IN ADULT, UNSPECIFIED OBESITY TYPE, UNSPECIFIED WHETHER SERIOUS COMORBIDITY PRESENT (HCC): Primary | ICD-10-CM

## 2019-07-08 PROCEDURE — 99214 OFFICE O/P EST MOD 30 MIN: CPT | Performed by: PHYSICIAN ASSISTANT

## 2019-07-08 NOTE — PROGRESS NOTES
Nancy Mccarthy is a 40year old female. Patient presents with:  Nutrition Counseling      HPI:   Lost 6 lbs since her las visit. Was drinking more alcohol lately due to holiday. Hip pain doing a little better which has allowed her to walk more.  Was ma (Other) Paternal Grandfather 61        pancreatic       MEDICAL HISTORY:     Past Medical History:   Diagnosis Date   • PONV (postoperative nausea and vomiting)    • Torn meniscus     bilateral knee surgery       CURRENT MEDICATIONS:       Current Outpatie activity: Not on file    Other Topics      Concerns:         Service: Not Asked        Blood Transfusions: Not Asked        Caffeine Concern: Yes          soda, coffee, 32 oz daily        Occupational Exposure: Not Asked        Hobby Hazards: Not A below.    Orders Placed This Visit:  No orders of the defined types were placed in this encounter. Patient Instructions   Protein handful, size of palm or fist.     2 tbsp is about the size of a hummus container. Can never eat too many veggies.

## 2019-07-08 NOTE — PATIENT INSTRUCTIONS
Protein handful, size of palm or fist.     2 tbsp is about the size of a hummus container. Can never eat too many veggies. Can try to add maple syrup to coffee instead of sugar. Agave. Keeping up the fasting. Keep up the good work!

## 2019-07-08 NOTE — H&P
Weight: 239 lbs  Circumference Measurements  Chest Waist Hips L arm R arm L thigh R thigh Total   44.25 42 52.5 14 14 27.5 27.75

## 2019-08-12 ENCOUNTER — OFFICE VISIT (OUTPATIENT)
Dept: INTEGRATIVE MEDICINE | Facility: CLINIC | Age: 44
End: 2019-08-12
Payer: COMMERCIAL

## 2019-08-12 VITALS — WEIGHT: 237 LBS | BODY MASS INDEX: 41 KG/M2

## 2019-08-12 DIAGNOSIS — E66.01 CLASS 3 SEVERE OBESITY WITH BODY MASS INDEX (BMI) OF 40.0 TO 44.9 IN ADULT, UNSPECIFIED OBESITY TYPE, UNSPECIFIED WHETHER SERIOUS COMORBIDITY PRESENT (HCC): Primary | ICD-10-CM

## 2019-08-12 DIAGNOSIS — M16.11 OSTEOARTHRITIS OF RIGHT HIP, UNSPECIFIED OSTEOARTHRITIS TYPE: ICD-10-CM

## 2019-08-12 PROCEDURE — 99214 OFFICE O/P EST MOD 30 MIN: CPT | Performed by: PHYSICIAN ASSISTANT

## 2019-08-12 NOTE — PROGRESS NOTES
Laurita Araya is a 40year old female. Patient presents with:  Nutrition Counseling      HPI:   Lost 2 lbs since last visit. Less active. Lost a few inches as well. Still doing fasting and liking it. Started doing clean bars as a snack.  Eating more mouth. Disp:  Rfl:    Famotidine (PEPCID AC) 10 MG Oral Chew Tab Chew 10 mg by mouth as needed for Heartburn.  Disp:  Rfl:        SOCIAL HISTORY:   Social History    Socioeconomic History      Marital status: Single      Spouse name: Not on file      Number Asked        Weight Concern: Not Asked        Special Diet: Not Asked        Back Care: Not Asked        Exercise: Yes          PT        Bike Helmet: Not Asked        Seat Belt: Not Asked        Self-Exams: Not Asked    Social History Narrative      The p

## 2021-03-31 ENCOUNTER — IMMUNIZATION (OUTPATIENT)
Dept: LAB | Facility: HOSPITAL | Age: 46
End: 2021-03-31
Attending: HOSPITALIST
Payer: COMMERCIAL

## 2021-03-31 DIAGNOSIS — Z23 NEED FOR VACCINATION: Primary | ICD-10-CM

## 2021-03-31 PROCEDURE — 0011A SARSCOV2 VAC 100MCG/0.5ML IM: CPT

## 2021-04-28 ENCOUNTER — IMMUNIZATION (OUTPATIENT)
Dept: LAB | Facility: HOSPITAL | Age: 46
End: 2021-04-28
Attending: EMERGENCY MEDICINE
Payer: COMMERCIAL

## 2021-04-28 DIAGNOSIS — Z23 NEED FOR VACCINATION: Primary | ICD-10-CM

## 2021-04-28 PROCEDURE — 0012A SARSCOV2 VAC 100MCG/0.5ML IM: CPT

## 2021-08-11 ENCOUNTER — HOSPITAL ENCOUNTER (OUTPATIENT)
Age: 46
Discharge: HOME | End: 2021-08-11
Attending: PHYSICIAN ASSISTANT
Payer: COMMERCIAL

## 2021-08-11 VITALS
DIASTOLIC BLOOD PRESSURE: 86 MMHG | RESPIRATION RATE: 16 BRPM | HEART RATE: 86 BPM | TEMPERATURE: 99 F | SYSTOLIC BLOOD PRESSURE: 137 MMHG | OXYGEN SATURATION: 99 %

## 2021-08-11 DIAGNOSIS — Z20.822 ENCOUNTER FOR LABORATORY TESTING FOR COVID-19 VIRUS: ICD-10-CM

## 2021-08-11 DIAGNOSIS — S60.012A CONTUSION OF LEFT THUMB WITHOUT DAMAGE TO NAIL, INITIAL ENCOUNTER: ICD-10-CM

## 2021-08-11 DIAGNOSIS — U07.1 COVID-19 VIRUS INFECTION: Primary | ICD-10-CM

## 2021-08-11 LAB
S PYO AG THROAT QL: NEGATIVE
SARS-COV-2 RNA RESP QL NAA+PROBE: DETECTED

## 2021-08-11 PROCEDURE — 99214 OFFICE O/P EST MOD 30 MIN: CPT | Performed by: PHYSICIAN ASSISTANT

## 2021-08-11 PROCEDURE — U0002 COVID-19 LAB TEST NON-CDC: HCPCS | Performed by: PHYSICIAN ASSISTANT

## 2021-08-11 PROCEDURE — 87880 STREP A ASSAY W/OPTIC: CPT | Performed by: PHYSICIAN ASSISTANT

## 2021-08-11 RX ORDER — ACETAMINOPHEN 325 MG/1
650 TABLET ORAL
Qty: 40 TABLET | Refills: 0 | Status: SHIPPED | OUTPATIENT
Start: 2021-08-11

## 2021-08-11 NOTE — ED PROVIDER NOTES
Patient Seen in: [facility]    History   Patient presents with:  Covid-19 Test    Stated Complaint: boyfriend + strep & covid/fever    HPI    44-year-old female presents with chief complaint of fever. Onset 2 days ago.   Patient states her dannie tobacco: Current User    Alcohol use: No      Alcohol/week: 0.0 standard drinks      Comment: beer occasionally    Drug use: No      Review of Systems    Positive for stated complaint: boyfriend + strep & covid/fever  Other systems are as noted in HPI.   Co obvious deformity. Neurological: Gross motor movement is intact in all 4 extremities. Patient exhibits normal speech. Skin: Skin is normal to inspection. Warm and dry. No obvious bruising. No obvious rash.     ED Course     Labs Reviewed   RAPID SARS- obtaining history, performing a physical exam, bedside monitoring of interventions, collecting and independently interpreting tests, discussion with consultants, patient communication/counseling, and chart documentation but not including time spent perform

## 2022-02-07 ENCOUNTER — OFFICE VISIT (OUTPATIENT)
Dept: OBGYN CLINIC | Facility: CLINIC | Age: 47
End: 2022-02-07
Payer: COMMERCIAL

## 2022-02-07 ENCOUNTER — LAB ENCOUNTER (OUTPATIENT)
Dept: LAB | Facility: HOSPITAL | Age: 47
End: 2022-02-07
Attending: NURSE PRACTITIONER
Payer: COMMERCIAL

## 2022-02-07 VITALS
WEIGHT: 254.63 LBS | HEART RATE: 79 BPM | SYSTOLIC BLOOD PRESSURE: 127 MMHG | DIASTOLIC BLOOD PRESSURE: 83 MMHG | BODY MASS INDEX: 44.56 KG/M2 | HEIGHT: 63.5 IN

## 2022-02-07 DIAGNOSIS — Z13.220 SCREENING CHOLESTEROL LEVEL: ICD-10-CM

## 2022-02-07 DIAGNOSIS — Z12.31 ENCOUNTER FOR SCREENING MAMMOGRAM FOR MALIGNANT NEOPLASM OF BREAST: ICD-10-CM

## 2022-02-07 DIAGNOSIS — Z13.1 SCREENING FOR DIABETES MELLITUS: ICD-10-CM

## 2022-02-07 DIAGNOSIS — N95.0 POSTMENOPAUSAL BLEEDING: Primary | ICD-10-CM

## 2022-02-07 DIAGNOSIS — N95.0 POSTMENOPAUSAL BLEEDING: ICD-10-CM

## 2022-02-07 DIAGNOSIS — Z12.4 SCREENING FOR MALIGNANT NEOPLASM OF CERVIX: ICD-10-CM

## 2022-02-07 DIAGNOSIS — Z87.410 HISTORY OF CERVICAL DYSPLASIA: ICD-10-CM

## 2022-02-07 PROBLEM — N87.1 MODERATE CERVICAL DYSPLASIA: Status: RESOLVED | Noted: 2022-02-07 | Resolved: 2022-02-07

## 2022-02-07 PROBLEM — N87.1 MODERATE CERVICAL DYSPLASIA: Status: ACTIVE | Noted: 2022-02-07

## 2022-02-07 LAB
ALBUMIN SERPL-MCNC: 4 G/DL (ref 3.4–5)
ALBUMIN/GLOB SERPL: 1.3 {RATIO} (ref 1–2)
ALT SERPL-CCNC: 26 U/L
ANION GAP SERPL CALC-SCNC: 4 MMOL/L (ref 0–18)
AST SERPL-CCNC: 11 U/L (ref 15–37)
B-HCG SERPL-ACNC: <1 MIU/ML
BILIRUB SERPL-MCNC: 0.6 MG/DL (ref 0.1–2)
BUN BLD-MCNC: 10 MG/DL (ref 7–18)
BUN/CREAT SERPL: 13.2 (ref 10–20)
CALCIUM BLD-MCNC: 9 MG/DL (ref 8.5–10.1)
CHLORIDE SERPL-SCNC: 106 MMOL/L (ref 98–112)
CHOLEST SERPL-MCNC: 210 MG/DL (ref ?–200)
CO2 SERPL-SCNC: 28 MMOL/L (ref 21–32)
CREAT BLD-MCNC: 0.76 MG/DL
DEPRECATED HBV CORE AB SER IA-ACNC: 22.9 NG/ML
DEPRECATED RDW RBC AUTO: 43.5 FL (ref 35.1–46.3)
ERYTHROCYTE [DISTWIDTH] IN BLOOD BY AUTOMATED COUNT: 14.1 % (ref 11–15)
EST. AVERAGE GLUCOSE BLD GHB EST-MCNC: 105 MG/DL (ref 68–126)
FASTING PATIENT LIPID ANSWER: YES
FASTING STATUS PATIENT QL REPORTED: YES
GLOBULIN PLAS-MCNC: 3.2 G/DL (ref 2.8–4.4)
GLUCOSE BLD-MCNC: 89 MG/DL (ref 70–99)
HBA1C MFR BLD: 5.3 % (ref ?–5.7)
HCT VFR BLD AUTO: 40.8 %
HDLC SERPL-MCNC: 60 MG/DL (ref 40–59)
HGB BLD-MCNC: 12.7 G/DL
IRON SATN MFR SERPL: 21 %
IRON SERPL-MCNC: 91 UG/DL
LDLC SERPL CALC-MCNC: 126 MG/DL (ref ?–100)
MCHC RBC AUTO-ENTMCNC: 31.1 G/DL (ref 31–37)
MCV RBC AUTO: 84.5 FL
NONHDLC SERPL-MCNC: 150 MG/DL (ref ?–130)
OSMOLALITY SERPL CALC.SUM OF ELEC: 285 MOSM/KG (ref 275–295)
PLATELET # BLD AUTO: 377 10(3)UL (ref 150–450)
POTASSIUM SERPL-SCNC: 4.7 MMOL/L (ref 3.5–5.1)
PROT SERPL-MCNC: 7.2 G/DL (ref 6.4–8.2)
RBC # BLD AUTO: 4.83 X10(6)UL
SODIUM SERPL-SCNC: 138 MMOL/L (ref 136–145)
TIBC SERPL-MCNC: 429 UG/DL (ref 240–450)
TRANSFERRIN SERPL-MCNC: 288 MG/DL (ref 200–360)
TRIGL SERPL-MCNC: 136 MG/DL (ref 30–149)
TSI SER-ACNC: 1.58 MIU/ML (ref 0.36–3.74)
VLDLC SERPL CALC-MCNC: 24 MG/DL (ref 0–30)
WBC # BLD AUTO: 6.9 X10(3) UL (ref 4–11)

## 2022-02-07 PROCEDURE — 99203 OFFICE O/P NEW LOW 30 MIN: CPT | Performed by: NURSE PRACTITIONER

## 2022-02-07 PROCEDURE — 82728 ASSAY OF FERRITIN: CPT

## 2022-02-07 PROCEDURE — 80053 COMPREHEN METABOLIC PANEL: CPT

## 2022-02-07 PROCEDURE — 80061 LIPID PANEL: CPT

## 2022-02-07 PROCEDURE — 3079F DIAST BP 80-89 MM HG: CPT | Performed by: NURSE PRACTITIONER

## 2022-02-07 PROCEDURE — 83540 ASSAY OF IRON: CPT

## 2022-02-07 PROCEDURE — 36415 COLL VENOUS BLD VENIPUNCTURE: CPT

## 2022-02-07 PROCEDURE — 85027 COMPLETE CBC AUTOMATED: CPT

## 2022-02-07 PROCEDURE — 3008F BODY MASS INDEX DOCD: CPT | Performed by: NURSE PRACTITIONER

## 2022-02-07 PROCEDURE — 84702 CHORIONIC GONADOTROPIN TEST: CPT

## 2022-02-07 PROCEDURE — 84466 ASSAY OF TRANSFERRIN: CPT

## 2022-02-07 PROCEDURE — 83036 HEMOGLOBIN GLYCOSYLATED A1C: CPT

## 2022-02-07 PROCEDURE — 3074F SYST BP LT 130 MM HG: CPT | Performed by: NURSE PRACTITIONER

## 2022-02-07 PROCEDURE — 84443 ASSAY THYROID STIM HORMONE: CPT | Performed by: NURSE PRACTITIONER

## 2022-02-08 ENCOUNTER — HOSPITAL ENCOUNTER (OUTPATIENT)
Dept: MAMMOGRAPHY | Age: 47
Discharge: HOME | End: 2022-02-08
Attending: NURSE PRACTITIONER
Payer: COMMERCIAL

## 2022-02-08 DIAGNOSIS — Z12.31 ENCOUNTER FOR SCREENING MAMMOGRAM FOR MALIGNANT NEOPLASM OF BREAST: ICD-10-CM

## 2022-02-08 LAB — HPV I/H RISK 1 DNA SPEC QL NAA+PROBE: NEGATIVE

## 2022-02-08 PROCEDURE — 77067 SCR MAMMO BI INCL CAD: CPT | Performed by: NURSE PRACTITIONER

## 2022-02-08 PROCEDURE — 77063 BREAST TOMOSYNTHESIS BI: CPT | Performed by: NURSE PRACTITIONER

## 2022-02-09 NOTE — PROGRESS NOTES
Normal mammogram. Repeat in one year, informed via [employer]t result note.     PAULO Phillips

## 2022-02-15 ENCOUNTER — HOSPITAL ENCOUNTER (OUTPATIENT)
Dept: ULTRASOUND IMAGING | Facility: HOSPITAL | Age: 47
Discharge: HOME | End: 2022-02-15
Attending: NURSE PRACTITIONER
Payer: COMMERCIAL

## 2022-02-15 DIAGNOSIS — N95.0 POSTMENOPAUSAL BLEEDING: ICD-10-CM

## 2022-02-15 PROCEDURE — 76830 TRANSVAGINAL US NON-OB: CPT | Performed by: NURSE PRACTITIONER

## 2022-02-15 PROCEDURE — 76856 US EXAM PELVIC COMPLETE: CPT | Performed by: NURSE PRACTITIONER

## 2022-02-16 ENCOUNTER — TELEPHONE (OUTPATIENT)
Dept: OBGYN CLINIC | Facility: CLINIC | Age: 47
End: 2022-02-16

## 2022-02-21 ENCOUNTER — OFFICE VISIT (OUTPATIENT)
Dept: OBGYN CLINIC | Facility: CLINIC | Age: 47
End: 2022-02-21
Payer: COMMERCIAL

## 2022-02-21 VITALS
DIASTOLIC BLOOD PRESSURE: 84 MMHG | BODY MASS INDEX: 44 KG/M2 | HEART RATE: 85 BPM | SYSTOLIC BLOOD PRESSURE: 123 MMHG | WEIGHT: 254 LBS

## 2022-02-21 DIAGNOSIS — N95.0 POSTMENOPAUSAL BLEEDING: Primary | ICD-10-CM

## 2022-02-21 DIAGNOSIS — R93.89 THICKENED ENDOMETRIUM: ICD-10-CM

## 2022-02-21 PROCEDURE — 58100 BIOPSY OF UTERUS LINING: CPT | Performed by: NURSE PRACTITIONER

## 2022-02-21 PROCEDURE — 3079F DIAST BP 80-89 MM HG: CPT | Performed by: NURSE PRACTITIONER

## 2022-02-21 PROCEDURE — 3074F SYST BP LT 130 MM HG: CPT | Performed by: NURSE PRACTITIONER

## 2022-02-21 NOTE — PROCEDURES
Endometrial Biopsy    Pre-Procedure Care:   Consent was obtained. Procedure/risks were explained. Questions were answered. Correct patient was identified. Correct side and site were confirmed. Pregnancy Results: negative from urine test   Birth control method(s) used: postmenopausal since 10/2018    Pre-Medications: The patient was premedicated with n/a. Description of Procedure:  Under satisfactory analgesia, the patient was prepped and draped in the dorsal lithotomy position. A bivalve speculum was placed in the vagina and the cervix was prepped with Betadine solution. Single tooth tenaculum placed at the 12 o'clock position. Cervical stenosis encountered. The cervix was dilated using minidilators. The uterine cavity was sounded at 8 cm. The endometrial cavity was curetted for pipelle tissue sampling, 2 passes. Specimen was sent to pathology. The single tooth tenaculum was removed. Silver nitrate was applied at the site of tenaculum application   Good hemostasis was noted. There were no complications. There was no blood loss. Discharge instructions were provided to the patient. Visit Plan:  Ultrasound report was reviewed with the patient. Lab results were reviewed with the patient. Await final pathology prior to treatment.  rev'd will recommend follow up with Dr. Cem Ramirez if pathology benign, rev'd if abnormal or cancer will refer to gynecology oncology.  patient agrees with plan of care     Allyson April, APRN

## 2022-03-07 ENCOUNTER — TELEPHONE (OUTPATIENT)
Dept: OBGYN CLINIC | Facility: CLINIC | Age: 47
End: 2022-03-07

## 2022-03-07 ENCOUNTER — OFFICE VISIT (OUTPATIENT)
Dept: OBGYN CLINIC | Facility: CLINIC | Age: 47
End: 2022-03-07
Payer: COMMERCIAL

## 2022-03-07 VITALS
BODY MASS INDEX: 45 KG/M2 | SYSTOLIC BLOOD PRESSURE: 112 MMHG | DIASTOLIC BLOOD PRESSURE: 76 MMHG | HEART RATE: 75 BPM | WEIGHT: 256 LBS

## 2022-03-07 DIAGNOSIS — N95.0 POST-MENOPAUSAL BLEEDING: Primary | ICD-10-CM

## 2022-03-07 DIAGNOSIS — N95.0 PMB (POSTMENOPAUSAL BLEEDING): Primary | ICD-10-CM

## 2022-03-07 PROCEDURE — 99212 OFFICE O/P EST SF 10 MIN: CPT | Performed by: OBSTETRICS & GYNECOLOGY

## 2022-03-07 PROCEDURE — 3078F DIAST BP <80 MM HG: CPT | Performed by: OBSTETRICS & GYNECOLOGY

## 2022-03-07 PROCEDURE — 3074F SYST BP LT 130 MM HG: CPT | Performed by: OBSTETRICS & GYNECOLOGY

## 2022-03-08 RX ORDER — MISOPROSTOL 200 UG/1
400 TABLET ORAL ONCE
Qty: 2 TABLET | Refills: 0 | Status: SHIPPED | OUTPATIENT
Start: 2022-03-08 | End: 2022-03-08

## 2022-03-08 NOTE — TELEPHONE ENCOUNTER
Spoke to ptLalitha Figueroa surgery is scheduled on Mon,03/21/2022 at 10am. cytotec instructions provided    [facility] at [phone] and spoke to Kuldip LARSON who stated NO PA Needed for surgery.  Call [phone]    Minor case instructions sent via [employer]    Message routed to RN pool to please place Cytotec Rx    Delayed staff message sent to MD to place pre-op order    Entered in book and calender

## 2022-03-08 NOTE — TELEPHONE ENCOUNTER
OB GYN SURGICAL SCHEDULING    Assessment: Postmenopausal bleeding    Pre-Operative Procedure:  Hysteroscopy with myosure and D&C    Admission:  Day Surgery    Anesthesia: General    Additional Orders:  Routine Orders    Comments / Orders to Nurse:  Possible dates:  3/15, 3/21, 3/28, 3/29 am.     Need cytotec prior to procedure    Discussed possible complications including but not limited to:  bleeding, infection and perforation of uterus

## 2022-03-19 ENCOUNTER — LAB ENCOUNTER (OUTPATIENT)
Dept: LAB | Age: 47
End: 2022-03-19
Attending: OBSTETRICS & GYNECOLOGY
Payer: COMMERCIAL

## 2022-03-19 DIAGNOSIS — Z01.818 PRE-OP TESTING: ICD-10-CM

## 2022-03-20 LAB — SARS-COV-2 RNA RESP QL NAA+PROBE: NOT DETECTED

## 2022-03-21 ENCOUNTER — ANESTHESIA (OUTPATIENT)
Dept: SURGERY | Facility: HOSPITAL | Age: 47
End: 2022-03-21
Payer: COMMERCIAL

## 2022-03-21 ENCOUNTER — HOSPITAL ENCOUNTER (OUTPATIENT)
Facility: HOSPITAL | Age: 47
Setting detail: HOSPITAL OUTPATIENT SURGERY
Discharge: HOME | End: 2022-03-21
Attending: OBSTETRICS & GYNECOLOGY | Admitting: OBSTETRICS & GYNECOLOGY
Payer: COMMERCIAL

## 2022-03-21 ENCOUNTER — ANESTHESIA EVENT (OUTPATIENT)
Dept: SURGERY | Facility: HOSPITAL | Age: 47
End: 2022-03-21
Payer: COMMERCIAL

## 2022-03-21 VITALS
WEIGHT: 256 LBS | OXYGEN SATURATION: 97 % | TEMPERATURE: 97 F | RESPIRATION RATE: 18 BRPM | SYSTOLIC BLOOD PRESSURE: 103 MMHG | BODY MASS INDEX: 44.8 KG/M2 | HEIGHT: 63.5 IN | DIASTOLIC BLOOD PRESSURE: 58 MMHG | HEART RATE: 71 BPM

## 2022-03-21 DIAGNOSIS — Z01.818 PRE-OP TESTING: Primary | ICD-10-CM

## 2022-03-21 DIAGNOSIS — N95.0 PMB (POSTMENOPAUSAL BLEEDING): ICD-10-CM

## 2022-03-21 PROCEDURE — 0UDB7ZZ EXTRACTION OF ENDOMETRIUM, VIA NATURAL OR ARTIFICIAL OPENING: ICD-10-PCS | Performed by: OBSTETRICS & GYNECOLOGY

## 2022-03-21 PROCEDURE — 58558 HYSTEROSCOPY BIOPSY: CPT | Performed by: OBSTETRICS & GYNECOLOGY

## 2022-03-21 PROCEDURE — 0UB98ZZ EXCISION OF UTERUS, VIA NATURAL OR ARTIFICIAL OPENING ENDOSCOPIC: ICD-10-PCS | Performed by: OBSTETRICS & GYNECOLOGY

## 2022-03-21 RX ORDER — HYDROCODONE BITARTRATE AND ACETAMINOPHEN 5; 325 MG/1; MG/1
2 TABLET ORAL AS NEEDED
Status: DISCONTINUED | OUTPATIENT
Start: 2022-03-21 | End: 2022-03-21

## 2022-03-21 RX ORDER — MORPHINE SULFATE 4 MG/ML
2 INJECTION, SOLUTION INTRAMUSCULAR; INTRAVENOUS EVERY 10 MIN PRN
Status: DISCONTINUED | OUTPATIENT
Start: 2022-03-21 | End: 2022-03-21

## 2022-03-21 RX ORDER — ACETAMINOPHEN 500 MG
1000 TABLET ORAL ONCE
Status: COMPLETED | OUTPATIENT
Start: 2022-03-21 | End: 2022-03-21

## 2022-03-21 RX ORDER — FAMOTIDINE 20 MG/1
20 TABLET, FILM COATED ORAL ONCE
Status: COMPLETED | OUTPATIENT
Start: 2022-03-21 | End: 2022-03-21

## 2022-03-21 RX ORDER — HYDROMORPHONE HYDROCHLORIDE 1 MG/ML
0.6 INJECTION, SOLUTION INTRAMUSCULAR; INTRAVENOUS; SUBCUTANEOUS EVERY 5 MIN PRN
Status: DISCONTINUED | OUTPATIENT
Start: 2022-03-21 | End: 2022-03-21

## 2022-03-21 RX ORDER — LIDOCAINE HYDROCHLORIDE 10 MG/ML
INJECTION, SOLUTION EPIDURAL; INFILTRATION; INTRACAUDAL; PERINEURAL AS NEEDED
Status: DISCONTINUED | OUTPATIENT
Start: 2022-03-21 | End: 2022-03-21 | Stop reason: SURG

## 2022-03-21 RX ORDER — HYDROCODONE BITARTRATE AND ACETAMINOPHEN 5; 325 MG/1; MG/1
1 TABLET ORAL EVERY 6 HOURS PRN
Status: DISCONTINUED | OUTPATIENT
Start: 2022-03-21 | End: 2022-03-21

## 2022-03-21 RX ORDER — HYDROCODONE BITARTRATE AND ACETAMINOPHEN 5; 325 MG/1; MG/1
2 TABLET ORAL EVERY 6 HOURS PRN
Status: DISCONTINUED | OUTPATIENT
Start: 2022-03-21 | End: 2022-03-21

## 2022-03-21 RX ORDER — DEXAMETHASONE SODIUM PHOSPHATE 4 MG/ML
VIAL (ML) INJECTION AS NEEDED
Status: DISCONTINUED | OUTPATIENT
Start: 2022-03-21 | End: 2022-03-21 | Stop reason: SURG

## 2022-03-21 RX ORDER — ONDANSETRON 2 MG/ML
INJECTION INTRAMUSCULAR; INTRAVENOUS AS NEEDED
Status: DISCONTINUED | OUTPATIENT
Start: 2022-03-21 | End: 2022-03-21 | Stop reason: SURG

## 2022-03-21 RX ORDER — NALOXONE HYDROCHLORIDE 0.4 MG/ML
80 INJECTION, SOLUTION INTRAMUSCULAR; INTRAVENOUS; SUBCUTANEOUS AS NEEDED
Status: DISCONTINUED | OUTPATIENT
Start: 2022-03-21 | End: 2022-03-21

## 2022-03-21 RX ORDER — SODIUM CHLORIDE, SODIUM LACTATE, POTASSIUM CHLORIDE, CALCIUM CHLORIDE 600; 310; 30; 20 MG/100ML; MG/100ML; MG/100ML; MG/100ML
INJECTION, SOLUTION INTRAVENOUS CONTINUOUS
Status: DISCONTINUED | OUTPATIENT
Start: 2022-03-21 | End: 2022-03-21

## 2022-03-21 RX ORDER — HYDROMORPHONE HYDROCHLORIDE 1 MG/ML
0.4 INJECTION, SOLUTION INTRAMUSCULAR; INTRAVENOUS; SUBCUTANEOUS EVERY 5 MIN PRN
Status: DISCONTINUED | OUTPATIENT
Start: 2022-03-21 | End: 2022-03-21

## 2022-03-21 RX ORDER — ONDANSETRON 2 MG/ML
4 INJECTION INTRAMUSCULAR; INTRAVENOUS EVERY 8 HOURS PRN
Status: DISCONTINUED | OUTPATIENT
Start: 2022-03-21 | End: 2022-03-21

## 2022-03-21 RX ORDER — ONDANSETRON 2 MG/ML
4 INJECTION INTRAMUSCULAR; INTRAVENOUS ONCE AS NEEDED
Status: DISCONTINUED | OUTPATIENT
Start: 2022-03-21 | End: 2022-03-21

## 2022-03-21 RX ORDER — MIDAZOLAM HYDROCHLORIDE 1 MG/ML
INJECTION INTRAMUSCULAR; INTRAVENOUS AS NEEDED
Status: DISCONTINUED | OUTPATIENT
Start: 2022-03-21 | End: 2022-03-21 | Stop reason: SURG

## 2022-03-21 RX ORDER — HYDROCODONE BITARTRATE AND ACETAMINOPHEN 5; 325 MG/1; MG/1
1 TABLET ORAL AS NEEDED
Status: DISCONTINUED | OUTPATIENT
Start: 2022-03-21 | End: 2022-03-21

## 2022-03-21 RX ORDER — KETOROLAC TROMETHAMINE 30 MG/ML
INJECTION, SOLUTION INTRAMUSCULAR; INTRAVENOUS AS NEEDED
Status: DISCONTINUED | OUTPATIENT
Start: 2022-03-21 | End: 2022-03-21 | Stop reason: SURG

## 2022-03-21 RX ORDER — MORPHINE SULFATE 4 MG/ML
4 INJECTION, SOLUTION INTRAMUSCULAR; INTRAVENOUS EVERY 10 MIN PRN
Status: DISCONTINUED | OUTPATIENT
Start: 2022-03-21 | End: 2022-03-21

## 2022-03-21 RX ORDER — ONDANSETRON 4 MG/1
4 TABLET, FILM COATED ORAL EVERY 8 HOURS PRN
Status: DISCONTINUED | OUTPATIENT
Start: 2022-03-21 | End: 2022-03-21

## 2022-03-21 RX ORDER — ACETAMINOPHEN 325 MG/1
650 TABLET ORAL EVERY 6 HOURS PRN
Status: DISCONTINUED | OUTPATIENT
Start: 2022-03-21 | End: 2022-03-21

## 2022-03-21 RX ORDER — HYDROMORPHONE HYDROCHLORIDE 1 MG/ML
0.2 INJECTION, SOLUTION INTRAMUSCULAR; INTRAVENOUS; SUBCUTANEOUS EVERY 5 MIN PRN
Status: DISCONTINUED | OUTPATIENT
Start: 2022-03-21 | End: 2022-03-21

## 2022-03-21 RX ORDER — METOCLOPRAMIDE 10 MG/1
10 TABLET ORAL ONCE
Status: COMPLETED | OUTPATIENT
Start: 2022-03-21 | End: 2022-03-21

## 2022-03-21 RX ORDER — PROCHLORPERAZINE EDISYLATE 5 MG/ML
5 INJECTION INTRAMUSCULAR; INTRAVENOUS ONCE AS NEEDED
Status: DISCONTINUED | OUTPATIENT
Start: 2022-03-21 | End: 2022-03-21

## 2022-03-21 RX ORDER — HALOPERIDOL 5 MG/ML
0.25 INJECTION INTRAMUSCULAR ONCE AS NEEDED
Status: DISCONTINUED | OUTPATIENT
Start: 2022-03-21 | End: 2022-03-21

## 2022-03-21 RX ORDER — MORPHINE SULFATE 10 MG/ML
6 INJECTION, SOLUTION INTRAMUSCULAR; INTRAVENOUS EVERY 10 MIN PRN
Status: DISCONTINUED | OUTPATIENT
Start: 2022-03-21 | End: 2022-03-21

## 2022-03-21 RX ADMIN — DEXAMETHASONE SODIUM PHOSPHATE 8 MG: 4 MG/ML VIAL (ML) INJECTION at 10:30:00

## 2022-03-21 RX ADMIN — KETOROLAC TROMETHAMINE 30 MG: 30 INJECTION, SOLUTION INTRAMUSCULAR; INTRAVENOUS at 10:58:00

## 2022-03-21 RX ADMIN — ONDANSETRON 4 MG: 2 INJECTION INTRAMUSCULAR; INTRAVENOUS at 10:30:00

## 2022-03-21 RX ADMIN — LIDOCAINE HYDROCHLORIDE 50 MG: 10 INJECTION, SOLUTION EPIDURAL; INFILTRATION; INTRACAUDAL; PERINEURAL at 10:25:00

## 2022-03-21 RX ADMIN — MIDAZOLAM HYDROCHLORIDE 2 MG: 1 INJECTION INTRAMUSCULAR; INTRAVENOUS at 10:19:00

## 2022-03-21 RX ADMIN — SODIUM CHLORIDE, SODIUM LACTATE, POTASSIUM CHLORIDE, CALCIUM CHLORIDE: 600; 310; 30; 20 INJECTION, SOLUTION INTRAVENOUS at 11:00:00

## 2022-03-21 NOTE — DISCHARGE SUMMARY
[facility]    Outpatient Surgery Discharge Summary    Alberto Donnelly Patient Status:  Hospital Outpatient Surgery    1975 MRN [id number]   Location [address] UNIT Attending Manoj Bradshaw MD   Hosp Day # 0 PCP Rachel Cote MD     Date of Admission: 3/21/2022     Date of Discharge:  3/21/2022    Admitting Diagnosis: Postmenopausal bleeding    Discharge Diagnosis: same    Procedures: Hysteroscopy with Myosure and D&C    Complications: none    Discharge Condition: Stable    Discharge Medications:      Discharge Medications      CONTINUE taking these medications      Instructions Prescription details   acetaminophen 325 MG Tabs  Commonly known as: TYLENOL      Take 2 tablets (650 mg total) by mouth every 4 to 6 hours as needed for Pain or Fever. Quantity: 40 tablet  Refills: 0     FAMOTIDINE OR      Take by mouth. Refills: 0             Follow up Visits:  Follow-up with Dr Alonzo Antonio in 2 weeks for post op exam      Chalo Bennett MD  3/21/2022  11:14 AM

## 2022-03-21 NOTE — OPERATIVE REPORT
[address] [facility] Detailed Operative Note    Zo Chowdhury Patient Status:  Hospital Outpatient Surgery    1975 MRN [id number]   Location [facility]EST[facility] UNIT Attending Saint Sample, MD   Hosp Day # 0 PCP Dayo Majano MD       Date:  3/21/2022    Preoperative Diagnosis: PMB (postmenopausal bleeding) [N95.0]    Postoperative Diagnosis:  PMB (postmenopausal bleeding) [N95.0]    Procedures:    Hysteroscopy with Myosure and D&C    Primary Surgeon:   Shyla Mark MD    Anesthesia:    General    Estimated Blood Loss:  20 cc    Antibiotics:     none    Complications:   none    Surgical Findings:  several endometrial polyps. Uterine cavity smooth and thin    Procedure Note:    After induction of general anesthesia, the patient was placed in a low dorsal lithotomy position. She was sterilely prepped and draped. Bladder was emptied via straight catheter. Pelvic exam was done. Cervix grasped with single tooth tenaculum. Cervix was dilated to accommodate Myosure hysteroscope. Normal saline was the medium. Findings as noted above. Myosure device used to remove multiple polyps in toto & without difficulty. Hysteroscope was removed. Sharp endometrial curettage was performed obtaining scant tissue. Single tooth tenaculum was removed. Silver nitrate stick was used for hemostasis. The final needle, sponge and instrument counts were correct. Fluid deficit from hysteroscopy was 350 cc. There were no complications. EBL 20cc. Specimens: endometrial polyps and endometrial curettage. The patient tolerated the procedure well and was taken to the recovery room in satisfactory condition.         Shyla Mark MD  3/21/2022

## 2022-03-21 NOTE — ANESTHESIA PROCEDURE NOTES
Airway  Date/Time: 3/21/2022 10:26 AM  Urgency: Elective      General Information and Staff    Patient location during procedure: OR  Anesthesiologist: Bereket White MD  Resident/CRNA: Zo Aguirre CRNA  Performed: CRNA     Indications and Patient Condition  Indications for airway management: anesthesia  Sedation level: deep  Preoxygenated: yes  Patient position: sniffing  Mask difficulty assessment: 0 - not attempted    Final Airway Details  Final airway type: supraglottic airway      Successful airway: classic  Size 4      Number of attempts at approach: 1  Number of other approaches attempted: 0    Additional Comments  Atraumatic. Lips, tongue and all teeth in preop condition.

## 2022-03-29 ENCOUNTER — IMMUNIZATION (OUTPATIENT)
Dept: LAB | Age: 47
End: 2022-03-29
Attending: EMERGENCY MEDICINE
Payer: COMMERCIAL

## 2022-03-29 DIAGNOSIS — Z23 NEED FOR VACCINATION: Primary | ICD-10-CM

## 2022-03-29 PROCEDURE — 0064A SARSCOV2 VAC 50MCG/0.25ML IM: CPT

## 2022-04-14 ENCOUNTER — OFFICE VISIT (OUTPATIENT)
Dept: OBGYN CLINIC | Facility: CLINIC | Age: 47
End: 2022-04-14
Payer: COMMERCIAL

## 2022-04-14 VITALS
DIASTOLIC BLOOD PRESSURE: 83 MMHG | BODY MASS INDEX: 44 KG/M2 | HEART RATE: 73 BPM | SYSTOLIC BLOOD PRESSURE: 117 MMHG | WEIGHT: 255 LBS

## 2022-04-14 DIAGNOSIS — Z98.890 POST-OPERATIVE STATE: Primary | ICD-10-CM

## 2022-04-14 PROCEDURE — 3079F DIAST BP 80-89 MM HG: CPT | Performed by: OBSTETRICS & GYNECOLOGY

## 2022-04-14 PROCEDURE — 3074F SYST BP LT 130 MM HG: CPT | Performed by: OBSTETRICS & GYNECOLOGY

## 2022-04-14 PROCEDURE — 99024 POSTOP FOLLOW-UP VISIT: CPT | Performed by: OBSTETRICS & GYNECOLOGY

## 2023-06-16 ENCOUNTER — HOSPITAL ENCOUNTER (EMERGENCY)
Facility: HOSPITAL | Age: 48
Discharge: HOME | End: 2023-06-16
Attending: EMERGENCY MEDICINE
Payer: COMMERCIAL

## 2023-06-16 VITALS
DIASTOLIC BLOOD PRESSURE: 72 MMHG | BODY MASS INDEX: 41.66 KG/M2 | OXYGEN SATURATION: 100 % | RESPIRATION RATE: 18 BRPM | TEMPERATURE: 98 F | HEIGHT: 64 IN | SYSTOLIC BLOOD PRESSURE: 141 MMHG | WEIGHT: 244 LBS | HEART RATE: 57 BPM

## 2023-06-16 DIAGNOSIS — R19.7 NAUSEA VOMITING AND DIARRHEA: Primary | ICD-10-CM

## 2023-06-16 DIAGNOSIS — R11.2 NAUSEA VOMITING AND DIARRHEA: Primary | ICD-10-CM

## 2023-06-16 LAB
ALBUMIN SERPL-MCNC: 3.8 G/DL (ref 3.4–5)
ALBUMIN/GLOB SERPL: 1.1 {RATIO} (ref 1–2)
ALP LIVER SERPL-CCNC: 73 U/L
ALT SERPL-CCNC: 29 U/L
ANION GAP SERPL CALC-SCNC: 10 MMOL/L (ref 0–18)
AST SERPL-CCNC: 14 U/L (ref 15–37)
BASOPHILS # BLD AUTO: 0.06 X10(3) UL (ref 0–0.2)
BASOPHILS NFR BLD AUTO: 0.4 %
BILIRUB SERPL-MCNC: 0.5 MG/DL (ref 0.1–2)
BILIRUB UR QL: NEGATIVE
BUN BLD-MCNC: 9 MG/DL (ref 7–18)
BUN/CREAT SERPL: 11.8 (ref 10–20)
CALCIUM BLD-MCNC: 9.2 MG/DL (ref 8.5–10.1)
CHLORIDE SERPL-SCNC: 108 MMOL/L (ref 98–112)
CLARITY UR: CLEAR
CO2 SERPL-SCNC: 24 MMOL/L (ref 21–32)
COLOR UR: YELLOW
CREAT BLD-MCNC: 0.76 MG/DL
DEPRECATED RDW RBC AUTO: 41.7 FL (ref 35.1–46.3)
EOSINOPHIL # BLD AUTO: 0.01 X10(3) UL (ref 0–0.7)
EOSINOPHIL NFR BLD AUTO: 0.1 %
ERYTHROCYTE [DISTWIDTH] IN BLOOD BY AUTOMATED COUNT: 14 % (ref 11–15)
GFR SERPLBLD BASED ON 1.73 SQ M-ARVRAT: 97 ML/MIN/1.73M2 (ref 60–?)
GLOBULIN PLAS-MCNC: 3.5 G/DL (ref 2.8–4.4)
GLUCOSE BLD-MCNC: 125 MG/DL (ref 70–99)
GLUCOSE UR-MCNC: NORMAL MG/DL
HCT VFR BLD AUTO: 39.6 %
HGB BLD-MCNC: 12.6 G/DL
HGB UR QL STRIP.AUTO: NEGATIVE
IMM GRANULOCYTES # BLD AUTO: 0.06 X10(3) UL (ref 0–1)
IMM GRANULOCYTES NFR BLD: 0.4 %
KETONES UR-MCNC: 80 MG/DL
LEUKOCYTE ESTERASE UR QL STRIP.AUTO: NEGATIVE
LIPASE SERPL-CCNC: 27 U/L (ref 13–75)
LYMPHOCYTES # BLD AUTO: 1.42 X10(3) UL (ref 1–4)
LYMPHOCYTES NFR BLD AUTO: 9.7 %
MCH RBC QN AUTO: 26.1 PG (ref 26–34)
MCHC RBC AUTO-ENTMCNC: 31.8 G/DL (ref 31–37)
MCV RBC AUTO: 82 FL
MONOCYTES # BLD AUTO: 0.35 X10(3) UL (ref 0.1–1)
MONOCYTES NFR BLD AUTO: 2.4 %
NEUTROPHILS # BLD AUTO: 12.68 X10 (3) UL (ref 1.5–7.7)
NEUTROPHILS # BLD AUTO: 12.68 X10(3) UL (ref 1.5–7.7)
NEUTROPHILS NFR BLD AUTO: 87 %
NITRITE UR QL STRIP.AUTO: NEGATIVE
OSMOLALITY SERPL CALC.SUM OF ELEC: 294 MOSM/KG (ref 275–295)
PH UR: 7 [PH] (ref 5–8)
PLATELET # BLD AUTO: 355 10(3)UL (ref 150–450)
POTASSIUM SERPL-SCNC: 3.8 MMOL/L (ref 3.5–5.1)
PROT SERPL-MCNC: 7.3 G/DL (ref 6.4–8.2)
PROT UR-MCNC: 30 MG/DL
RBC # BLD AUTO: 4.83 X10(6)UL
SODIUM SERPL-SCNC: 142 MMOL/L (ref 136–145)
SP GR UR STRIP: 1.03 (ref 1–1.03)
UROBILINOGEN UR STRIP-ACNC: NORMAL
WBC # BLD AUTO: 14.6 X10(3) UL (ref 4–11)

## 2023-06-16 PROCEDURE — 96374 THER/PROPH/DIAG INJ IV PUSH: CPT

## 2023-06-16 PROCEDURE — 96361 HYDRATE IV INFUSION ADD-ON: CPT

## 2023-06-16 PROCEDURE — 80053 COMPREHEN METABOLIC PANEL: CPT | Performed by: EMERGENCY MEDICINE

## 2023-06-16 PROCEDURE — 85025 COMPLETE CBC W/AUTO DIFF WBC: CPT | Performed by: EMERGENCY MEDICINE

## 2023-06-16 PROCEDURE — 99284 EMERGENCY DEPT VISIT MOD MDM: CPT

## 2023-06-16 PROCEDURE — 83690 ASSAY OF LIPASE: CPT | Performed by: EMERGENCY MEDICINE

## 2023-06-16 PROCEDURE — 81001 URINALYSIS AUTO W/SCOPE: CPT | Performed by: EMERGENCY MEDICINE

## 2023-06-16 RX ORDER — ONDANSETRON 2 MG/ML
4 INJECTION INTRAMUSCULAR; INTRAVENOUS ONCE
Status: COMPLETED | OUTPATIENT
Start: 2023-06-16 | End: 2023-06-16

## 2023-06-16 RX ORDER — ONDANSETRON 4 MG/1
4 TABLET, ORALLY DISINTEGRATING ORAL EVERY 4 HOURS PRN
Qty: 10 TABLET | Refills: 0 | Status: SHIPPED | OUTPATIENT
Start: 2023-06-16 | End: 2023-06-23

## 2023-06-16 NOTE — ED INITIAL ASSESSMENT (HPI)
Ambulatory to ED constant diarrhea and vomiting since 10PM, came out of nowhere initially felt like indigestion and then \"just blew up,\" stool is liquidy and looks red like blood. Having generalized abdominal cramping. Pt states she feels febrile. AXOX4.

## 2025-03-13 ENCOUNTER — HOSPITAL ENCOUNTER (OUTPATIENT)
Age: 50
Discharge: HOME | End: 2025-03-13
Payer: COMMERCIAL

## 2025-03-13 VITALS
TEMPERATURE: 98 F | RESPIRATION RATE: 18 BRPM | OXYGEN SATURATION: 96 % | SYSTOLIC BLOOD PRESSURE: 140 MMHG | HEART RATE: 72 BPM | DIASTOLIC BLOOD PRESSURE: 86 MMHG

## 2025-03-13 DIAGNOSIS — H65.192 OTHER NON-RECURRENT ACUTE NONSUPPURATIVE OTITIS MEDIA OF LEFT EAR: Primary | ICD-10-CM

## 2025-03-13 DIAGNOSIS — M26.609 TMJ DYSFUNCTION: ICD-10-CM

## 2025-03-13 LAB
ATRIAL RATE: 64 BPM
P AXIS: 45 DEGREES
P-R INTERVAL: 158 MS
Q-T INTERVAL: 412 MS
QRS DURATION: 80 MS
QTC CALCULATION (BEZET): 425 MS
R AXIS: 15 DEGREES
T AXIS: 15 DEGREES
VENTRICULAR RATE: 64 BPM

## 2025-03-13 NOTE — ED INITIAL ASSESSMENT (HPI)
Pt c/o pain to left ear + pain to right side of her face started yesterday denies dental problem denies fever

## 2025-03-13 NOTE — DISCHARGE INSTRUCTIONS
Augmentin 1 tablet twice a day for 7 days, take with food  Ibuprofen 600 mg every 6 hours as needed with food   Warm compresses to right jaw area, 10 minutes at a time a few times per day  ER for worsening symptoms  Follow-up with primary care provider in 7 days if no improvement

## 2025-03-13 NOTE — ED PROVIDER NOTES
Chief Complaint   Patient presents with    Pain       HPI:     Radha Guallpa is a 50 year old female who presents with a chief complaint of right jaw pain, painful chewing on right side, left ear pain, ongoing since yesterday.  Reports symptoms are preceded by 1 week of cough which has overall improved.  No fever.  No chest pain or shortness of breath.  No nausea, vomiting, diarrhea, or abdominal pain.  No cardiac history.    PFSH  PFSH asessment screens reviewed and agree.  Nursing note reviewed and I agree with documentation.    Family History   Problem Relation Age of Onset    Cancer Father 50        pancreatic    Other (Other) Father 50        pancreatic    Diabetes Other     Hypertension Maternal Grandmother     Other (Other) Maternal Grandmother 87        uterine    Ovarian Cancer Maternal Aunt 50    Diabetes Maternal Aunt     Other (Other) Paternal Grandfather 60        pancreatic    Diabetes Mother     Cancer Maternal Grandfather 67        pancreatic ca     Family history reviewed with patient/caregiver and is not pertinent to presenting problem.  Social History     Socioeconomic History    Marital status: Single     Spouse name: Not on file    Number of children: Not on file    Years of education: Not on file    Highest education level: Not on file   Occupational History    Not on file   Tobacco Use    Smoking status: Former     Current packs/day: 1.00     Types: Cigarettes    Smokeless tobacco: Former   Vaping Use    Vaping status: Every Day    Substances: Nicotine    Devices: Refillable tank   Substance and Sexual Activity    Alcohol use: Yes     Alcohol/week: 0.0 standard drinks of alcohol     Comment: 2 beers and 2 margaritas occasionally    Drug use: Never    Sexual activity: Yes     Birth control/protection: Condom   Other Topics Concern     Service Not Asked    Blood Transfusions Not Asked    Caffeine Concern Yes     Comment: soda, coffee, 32 oz daily    Occupational Exposure Not  Asked    Hobby Hazards Not Asked    Sleep Concern Not Asked    Stress Concern Not Asked    Weight Concern Not Asked    Special Diet Not Asked    Back Care Not Asked    Exercise Yes     Comment: PT    Bike Helmet Not Asked    Seat Belt Not Asked    Self-Exams Not Asked   Social History Narrative    The patient does not use an assistive device..      The patient does live in a home with stairs.     Social Drivers of Health     Food Insecurity: Not on file   Transportation Needs: Not on file   Housing Stability: Not on file         ROS:   Positive for stated complaint: ear problem  All other systems reviewed and negative except as noted above.  Constitutional and Vital Signs Reviewed.      Physical Exam:     Findings:    /86   Pulse 72   Temp 98.1 °F (36.7 °C) (Oral)   Resp 18   LMP 03/01/2022   SpO2 96%   GENERAL: well developed, well nourished, well hydrated, no distress  HEAD: normocephalic  NECK: supple, no adenopathy  EYES: sclera non icteric bilateral, conjunctiva clear  EARS: TM  right: normal and left: erythema and external auditory canals clear. No TMJ tenderness or facial swelling bilaterally  NOSE: nasal turbinates: pink, normal mucosa.  No sinus tenderness.  THROAT: clear, without exudates  CARDIO: RRR without murmur  LUNGS: clear to auscultation bilaterally; no rales, rhonchi, or wheezes  EXTREMITIES: no cyanosis or edema. ONEAL without difficulty  SKIN: good skin turgor, no obvious rashes    MDM/Assessment/Plan:   Orders for this encounter:    Orders Placed This Encounter    EKG 12 Lead     Order Specific Question:   Release to patient     Answer:   Immediate    amoxicillin clavulanate 875-125 MG Oral Tab     Sig: Take 1 tablet by mouth 2 (two) times daily for 7 days.     Dispense:  14 tablet     Refill:  0       Labs performed this visit:  Recent Results (from the past 10 hours)   EKG 12 Lead    Collection Time: 03/13/25  1:11 PM   Result Value Ref Range    Ventricular rate 64 BPM    Atrial rate  64 BPM    P-R Interval 158 ms    QRS Duration 80 ms    Q-T Interval 412 ms    QTC Calculation (Bezet) 425 ms    P Axis 45 degrees    R Axis 15 degrees    T Axis 15 degrees       MDM:  Medical Decision Making  Differentials considered: Otitis media versus otitis externa versus TMJ dysfunction versus atypical ACS symptoms versus other    HPI and exam most consistent with left otitis media, along with right TMJ dysfunction.  EKG is within normal limits, low suspicion for atypical ACS symptoms.  Will start Augmentin, warm compresses, ibuprofen.  Patient was advised to go to the ER for any new or worsening symptoms.  Follow-up with primary care provider in 1 week if no improvement.  Patient verbalized understanding and agreeable to plan of care.     Case discussed with Dr. Larry Galloway    Amount and/or Complexity of Data Reviewed  ECG/medicine tests: ordered and independent interpretation performed. Decision-making details documented in ED Course.     Details: EKG: SR, rate 64, no ST segment changes, no previous to compare         Diagnosis:    ICD-10-CM    1. Other non-recurrent acute nonsuppurative otitis media of left ear  H65.192       2. TMJ dysfunction  M26.609           All results reviewed and discussed with patient.  See AVS for detailed discharge instructions for your condition today.    Follow Up with:  No follow-up provider specified.

## 2025-06-24 ENCOUNTER — OFFICE VISIT (OUTPATIENT)
Age: 50
End: 2025-06-24
Payer: COMMERCIAL

## 2025-06-24 ENCOUNTER — HOSPITAL ENCOUNTER (OUTPATIENT)
Dept: GENERAL RADIOLOGY | Facility: HOSPITAL | Age: 50
Discharge: HOME | End: 2025-06-24
Attending: ORTHOPAEDIC SURGERY
Payer: COMMERCIAL

## 2025-06-24 ENCOUNTER — TELEPHONE (OUTPATIENT)
Age: 50
End: 2025-06-24

## 2025-06-24 VITALS — BODY MASS INDEX: 38.93 KG/M2 | WEIGHT: 228 LBS | HEIGHT: 64 IN

## 2025-06-24 DIAGNOSIS — R52 PAIN: ICD-10-CM

## 2025-06-24 DIAGNOSIS — M16.11 OSTEOARTHRITIS OF RIGHT HIP, UNSPECIFIED OSTEOARTHRITIS TYPE: Primary | ICD-10-CM

## 2025-06-24 DIAGNOSIS — E66.812 CLASS 2 OBESITY WITH BODY MASS INDEX (BMI) OF 39.0 TO 39.9 IN ADULT, UNSPECIFIED OBESITY TYPE, UNSPECIFIED WHETHER SERIOUS COMORBIDITY PRESENT: ICD-10-CM

## 2025-06-24 PROBLEM — E66.9 OBESITY, UNSPECIFIED: Status: ACTIVE | Noted: 2019-03-13

## 2025-06-24 PROCEDURE — 73502 X-RAY EXAM HIP UNI 2-3 VIEWS: CPT | Performed by: ORTHOPAEDIC SURGERY

## 2025-06-24 PROCEDURE — 3008F BODY MASS INDEX DOCD: CPT | Performed by: ORTHOPAEDIC SURGERY

## 2025-06-24 PROCEDURE — 99205 OFFICE O/P NEW HI 60 MIN: CPT | Performed by: ORTHOPAEDIC SURGERY

## 2025-06-24 NOTE — PROGRESS NOTES
Chief Complaint: Hip Pain (Right pain - Pain since 2019. Has done PT in the past. Pain is constant, worse with certain activity. Would like to discuss sx. Pain mainly in groin. States right leg is longer than left. )      HPI  Ms. Guallpa is referred by No ref. provider found. Radha Guallpa is a 50 year old female being seen in the office today for Hip Pain (Right pain - Pain since 2019. Has done PT in the past. Pain is constant, worse with certain activity. Would like to discuss sx. Pain mainly in groin. States right leg is longer than left. )    Patient is a 50-year-old female presents today complaints of right hip pain.  She states that this started 6 years ago.  She has been diagnosed with osteoarthritis.  She was seen by a surgeon at [facility].  She was recommended weight loss prior to surgery.  At that time she was also given a cortisone injection which gave her no relief.  She also was given physical therapy which worsened her symptoms.  She also was able to lose over 30 pounds since then.  After this, due to insurance issues as well as COVID she did not pursue surgery.  Pain recently has gotten worse.  Denies any inciting event or trauma.  Pain localizes to the groin region.  She notes it with weightbearing activities.  She does think that it affects her quality of life and ADLs.  She has trouble walking more than half a block.  She has trouble going from sit to stand.  She has trouble putting shoes and socks on.  She has trouble sleeping at night.  She also is noticing a limp and feels a touch longer on the affected side.  She denies numbness or tingling.  She denies any back pain.  She lives at home with her boyfriend and son.  She lives on the first floor.  She works from home on the computer as an .  She is a vape user.    History:  Past Medical History[1]  Past Surgical History[2]  Family History[3]  Family Status   Relation Status    Fa     Other (Not Specified)    MGMA (Not  Specified)    Mat Aunt (Not Specified)    PGFA (Not Specified)    Mo Alive    MGFA (Not Specified)     Social History     Occupational History    Not on file   Tobacco Use    Smoking status: Former     Current packs/day: 1.00     Types: Cigarettes    Smokeless tobacco: Former   Vaping Use    Vaping status: Every Day    Substances: Nicotine    Devices: Refillable tank   Substance and Sexual Activity    Alcohol use: Yes     Alcohol/week: 0.0 standard drinks of alcohol     Comment: 2 beers and 2 margaritas occasionally    Drug use: Never    Sexual activity: Yes     Birth control/protection: Condom       Medications:  Medications - Current[4]    Allergies:  Allergies[5]    Review of Systems  A comprehensive review of systems was completed and is negative unless noted above or in the HPI.    Physical Exam  Ht 5' 4\" (1.626 m)   Wt 228 lb (103.4 kg)   LMP 03/01/2022   BMI 39.14 kg/m²   Body mass index is 39.14 kg/m².    Constitutional: The patient appears well-developed and well-nourished, in no apparent distress.  Psychiatric: The patient demonstrates good comprehension, judgment and decision making. Normal mood and affect.  Eyes: PER and EOM are normal.  ENT: Hearing appropriate for normal conversation.   Cardiovascular: The patient has symmetric pulses, 2+.  Distal extremity is warm and well perfused with good capillary refill.  Respiratory:  The patient is breathing comfortably without increased respiratory effort or use of accessory muscles.  Hematologic/Lymphatic: No lymphangitis. There is no appreciable enlargement of lymph nodes. No calf swelling, calf non-tender, negative Hansen's sign. No edema.  Skin: No wounds or ulcers. No hypertrophic scarring.  Neck: FROM without pain.  MSK:    Right hip        Skin: Intact, no lesions or intertrigo in the groin region.       Deformity: None       Tenderness: None       Range of Motion:      Extension: 0     Flexion Contracture: 0     Flexion: 80     Internal Rotation: 5      External Rotation: 20     Abduction: 15     Adduction: 15       Muscle Strength      Abduction: 5/5     Adduction: 5/5     Flexion: 5/5         Gait: Antalgic   Trendelenburg Lurch Negative   Trendelenburg Sign Negative   Stinchfield: Positive   Tiffanie Test: Negative   Deshawn Test: Negative   Anterior Impingement: Positive   Posterior Impingement: Negative   Leg Length Discrepancy Negative       Motor: intact   Sensory: intact   Vascular: intact      Labs:  Lab Results   Component Value Date    WBC 14.6 (H) 06/16/2023    HGB 12.6 06/16/2023    HCT 39.6 06/16/2023    .0 06/16/2023     Lab Results   Component Value Date    ALB 3.8 06/16/2023    A1C 5.3 02/07/2022     Lab Results   Component Value Date     (H) 06/16/2023    GLU 89 02/07/2022     (H) 05/19/2018         Imaging:  I independently reviewed the patient's relevant imaging studies today.    A standing low-set AP pelvis, and 2 views of the affected hip were obtained today.  There is no evidence of acute fracture, dislocation, or bony lesions.  There is evidence of joint space narrowing with osteophyte formation, and subchondral sclerosis. There is not a leg length discrepancy.    Assessment and Plan  Assessment & Plan  Pain    Orders:    XR HIP W OR WO PELVIS 2 OR 3 VIEWS, RIGHT (CPT=73502) [[id number]] - Orthopedic providers only; Future    Osteoarthritis of right hip, unspecified osteoarthritis type         Class 2 obesity with body mass index (BMI) of 39.0 to 39.9 in adult, unspecified obesity type, unspecified whether serious comorbidity present           The patient history, physical examination and imaging studies are consistent with the diagnosis above. Options were discussed with the patient today, including continued conservative management vs. potential surgical intervention.  Nonoperative management includes activity modification or restriction, weight loss if applicable, anti-inflammatory medication, physical therapy or a home  exercise program,  intra-articular corticosteroid injections, and assistive device usage.  If their hip pain does not respond to conservative measures and causes severe limitations in their activities of daily living, then a total hip arthroplasty can be considered.  Given that the patient has attempted and failed these nonoperative modalities, and continue to have functionally limiting pain that is negatively affecting quality of life, I recommend surgical intervention in the form of a total hip arthroplasty.      I had a lengthy discussion about joint replacement.  They were also given AAKS handouts on joint replacement.  We discussed the importance of preoperative clearances, labs and appropriate studies.  We discussed the preoperative, intraoperative and postoperative course related to the surgery.  We discussed the importance of postoperative rehabilitation after surgery.  Risks, benefits and alternatives of the surgery were discussed.  Risks include but are not limited to superficial and deep infection, DVT/PE, damage to surrounding nerves and vessels, bleeding, need for blood transfusion, injury to surrounding structures including nerves, vessels, ligaments, tendons or bone, iatrogenic fractures, dislocation/instability, leg length inequality, loosening, polyethylene wear, osteolysis, continued pain, need for future surgeries, risks for anesthesia including MI, stroke, loss of limb, or loss of life.  I did discuss that she is obese and does have a higher risk for wound complications as well as other cardiopulmonary risks.  Discussed pannus management for an anterior approach as well.  Understanding of these these risks, the patient elected to proceed with surgical planning.    As such, I recommend the following:    Surgery scheduling performed.  They will need medical clearance and dental clearance.   She must stop smoking and vaping.  I did discuss that she will be tested with a urine nicotine test 1 week  prior to surgery  In the interim, they will continue activity modification, Tylenol, anti-inflammatory medication ( until 1 week prior to surgery date), and assistive device usage for symptomatic relief.  Activities: As tolerated  Follow-up: 1 week prior to surgery for preoperative H&P visit.     BMI is is above average; BMI management plan is completed    Tania Barrera MD    Rationale for 57 Modifier Addendum    Decision for Major Surgery  The decision for a major surgery was made during this visit/consultation based on review and discussion of the history of presentation, examination, available labs/imaging, and the patient's functional status. The medical and surgical history were considered with regards to risk and potential modifications needed.           [1]   Past Medical History:   Arthritis    Esophageal reflux    PONV (postoperative nausea and vomiting)    Torn meniscus    bilateral knee surgery    Visual impairment    Readers   [2]   Past Surgical History:  Procedure Laterality Date    Forearm/wrist surgery unlisted Left     plate/pins    Hysteroscopy  03/2022    with myosure--polyps    Knee surgery Bilateral     Other surgical history  2017    cold knife cervical conization   [3]   Family History  Problem Relation Age of Onset    Cancer Father 50        pancreatic    Other (Other) Father 50        pancreatic    Diabetes Other     Hypertension Maternal Grandmother     Other (Other) Maternal Grandmother 87        uterine    Ovarian Cancer Maternal Aunt 50    Diabetes Maternal Aunt     Other (Other) Paternal Grandfather 60        pancreatic    Diabetes Mother     Cancer Maternal Grandfather 67        pancreatic ca   [4]   Current Outpatient Medications:     FAMOTIDINE OR, Take by mouth., Disp: , Rfl:     acetaminophen 325 MG Oral Tab, Take 2 tablets (650 mg total) by mouth every 4 to 6 hours as needed for Pain or Fever., Disp: 40 tablet, Rfl: 0  [5] No Known Allergies

## 2025-06-24 NOTE — TELEPHONE ENCOUNTER
Type of surgery:  Right Anterior Total Hip Arthroplasty   Date: 7/28/25  Location: [facility]   Medical Clearance:      *Medical: yes      *Dental: Yes      *Other:  Prior Authorization Status: Pending   Workers Comp:  Medacta/Zohreh:  Laurel: Yes  POV: 8/19/25

## 2025-06-24 NOTE — TELEPHONE ENCOUNTER
Ortho Surgery Request  Surgery: Right Anterior Total Hip Arthroplasty   Surgical Assistant: Yes  Surgery Day Request:   Estimated Procedure Length: 75 mins  Anesthesia type:  Spinal   Position: Supine   Special Needs:[]Mini C-Arm  [x]Large C-arm - Large QMedic 3D  [x]Nurse Assist [x]SCD's [x]Surgical Assistant []Ultrasound []Ultrasound Malachi  [x] [place] Table  Equipment: [employer]    Location:Main OR  Post Op Visit Date: 3 weeks    CPT Code: 16293     ICD Code: M16.11  Medical Clearance Needed: Medical, Dental  Preadmission Testing Orders:  Use surgeon's preferences card  Additional PAT orders:  Pre OP Orders:  Use [facility] surgeon's personalized order set  Additional Pre Op Orders:  Urine Nicotine Metabolites  PCN Allergy:  No  If Yes:   __X__  Admit:  Hospital outpatient surgery  [x]Same day discharge  Home Health  Yes

## 2025-06-24 NOTE — TELEPHONE ENCOUNTER
Patient was given pre surgical papers today in clinic. Including Dental clearance form, [facility] Surgery Check List (Medical clearance), Pre Operative Education for Total Joint replacements. Patient chose 7/28 for her surgery date.

## 2025-07-11 ENCOUNTER — OFFICE VISIT (OUTPATIENT)
Dept: FAMILY MEDICINE CLINIC | Facility: CLINIC | Age: 50
End: 2025-07-11
Payer: COMMERCIAL

## 2025-07-11 ENCOUNTER — LAB ENCOUNTER (OUTPATIENT)
Dept: LAB | Age: 50
End: 2025-07-11
Payer: COMMERCIAL

## 2025-07-11 VITALS
HEIGHT: 64 IN | HEART RATE: 62 BPM | DIASTOLIC BLOOD PRESSURE: 73 MMHG | WEIGHT: 232 LBS | SYSTOLIC BLOOD PRESSURE: 109 MMHG | OXYGEN SATURATION: 96 % | BODY MASS INDEX: 39.61 KG/M2

## 2025-07-11 DIAGNOSIS — Z01.818 PREOP EXAMINATION: Primary | ICD-10-CM

## 2025-07-11 DIAGNOSIS — Z01.818 PREOP EXAMINATION: ICD-10-CM

## 2025-07-11 DIAGNOSIS — M16.11 OSTEOARTHRITIS OF RIGHT HIP, UNSPECIFIED OSTEOARTHRITIS TYPE: ICD-10-CM

## 2025-07-11 LAB
ANION GAP SERPL CALC-SCNC: 12 MMOL/L (ref 0–18)
ATRIAL RATE: 63 BPM
BASOPHILS # BLD AUTO: 0.07 X10(3) UL (ref 0–0.2)
BASOPHILS NFR BLD AUTO: 0.8 %
BILIRUB UR QL: NEGATIVE
BUN BLD-MCNC: 14 MG/DL (ref 9–23)
BUN/CREAT SERPL: 17.9 (ref 10–20)
CALCIUM BLD-MCNC: 9.7 MG/DL (ref 8.7–10.4)
CHLORIDE SERPL-SCNC: 102 MMOL/L (ref 98–112)
CLARITY UR: CLEAR
CO2 SERPL-SCNC: 26 MMOL/L (ref 21–32)
CREAT BLD-MCNC: 0.78 MG/DL (ref 0.55–1.02)
DEPRECATED RDW RBC AUTO: 46.3 FL (ref 35.1–46.3)
EGFRCR SERPLBLD CKD-EPI 2021: 92 ML/MIN/1.73M2 (ref 60–?)
EOSINOPHIL # BLD AUTO: 0.18 X10(3) UL (ref 0–0.7)
EOSINOPHIL NFR BLD AUTO: 2 %
ERYTHROCYTE [DISTWIDTH] IN BLOOD BY AUTOMATED COUNT: 15.3 % (ref 11–15)
EST. AVERAGE GLUCOSE BLD GHB EST-MCNC: 114 MG/DL (ref 68–126)
FASTING STATUS PATIENT QL REPORTED: NO
GLUCOSE BLD-MCNC: 93 MG/DL (ref 70–99)
GLUCOSE UR-MCNC: NORMAL MG/DL
HBA1C MFR BLD: 5.6 % (ref ?–5.7)
HCT VFR BLD AUTO: 41.3 % (ref 35–48)
HGB BLD-MCNC: 13.1 G/DL (ref 12–16)
HGB UR QL STRIP.AUTO: NEGATIVE
IMM GRANULOCYTES # BLD AUTO: 0.02 X10(3) UL (ref 0–1)
IMM GRANULOCYTES NFR BLD: 0.2 %
KETONES UR-MCNC: NEGATIVE MG/DL
LEUKOCYTE ESTERASE UR QL STRIP.AUTO: 500
LYMPHOCYTES # BLD AUTO: 2.85 X10(3) UL (ref 1–4)
LYMPHOCYTES NFR BLD AUTO: 31.3 %
MCH RBC QN AUTO: 26.3 PG (ref 26–34)
MCHC RBC AUTO-ENTMCNC: 31.7 G/DL (ref 31–37)
MCV RBC AUTO: 82.8 FL (ref 80–100)
MONOCYTES # BLD AUTO: 0.33 X10(3) UL (ref 0.1–1)
MONOCYTES NFR BLD AUTO: 3.6 %
NEUTROPHILS # BLD AUTO: 5.65 X10 (3) UL (ref 1.5–7.7)
NEUTROPHILS # BLD AUTO: 5.65 X10(3) UL (ref 1.5–7.7)
NEUTROPHILS NFR BLD AUTO: 62.1 %
NITRITE UR QL STRIP.AUTO: NEGATIVE
OSMOLALITY SERPL CALC.SUM OF ELEC: 290 MOSM/KG (ref 275–295)
P AXIS: 37 DEGREES
P-R INTERVAL: 168 MS
PH UR: 5.5 [PH] (ref 5–8)
PLATELET # BLD AUTO: 363 10(3)UL (ref 150–450)
POTASSIUM SERPL-SCNC: 4 MMOL/L (ref 3.5–5.1)
PROT UR-MCNC: NEGATIVE MG/DL
Q-T INTERVAL: 418 MS
QRS DURATION: 72 MS
QTC CALCULATION (BEZET): 427 MS
R AXIS: 19 DEGREES
RBC # BLD AUTO: 4.99 X10(6)UL (ref 3.8–5.3)
SODIUM SERPL-SCNC: 140 MMOL/L (ref 136–145)
SP GR UR STRIP: 1.01 (ref 1–1.03)
T AXIS: 17 DEGREES
UROBILINOGEN UR STRIP-ACNC: NORMAL
VENTRICULAR RATE: 63 BPM
WBC # BLD AUTO: 9.1 X10(3) UL (ref 4–11)

## 2025-07-11 PROCEDURE — 80048 BASIC METABOLIC PNL TOTAL CA: CPT

## 2025-07-11 PROCEDURE — 83036 HEMOGLOBIN GLYCOSYLATED A1C: CPT

## 2025-07-11 PROCEDURE — 87641 MR-STAPH DNA AMP PROBE: CPT

## 2025-07-11 PROCEDURE — 36415 COLL VENOUS BLD VENIPUNCTURE: CPT

## 2025-07-11 PROCEDURE — 3008F BODY MASS INDEX DOCD: CPT

## 2025-07-11 PROCEDURE — 87086 URINE CULTURE/COLONY COUNT: CPT

## 2025-07-11 PROCEDURE — 3074F SYST BP LT 130 MM HG: CPT

## 2025-07-11 PROCEDURE — 85025 COMPLETE CBC W/AUTO DIFF WBC: CPT

## 2025-07-11 PROCEDURE — 93005 ELECTROCARDIOGRAM TRACING: CPT

## 2025-07-11 PROCEDURE — 93010 ELECTROCARDIOGRAM REPORT: CPT | Performed by: INTERNAL MEDICINE

## 2025-07-11 PROCEDURE — 81001 URINALYSIS AUTO W/SCOPE: CPT

## 2025-07-11 PROCEDURE — 99214 OFFICE O/P EST MOD 30 MIN: CPT

## 2025-07-11 PROCEDURE — 3078F DIAST BP <80 MM HG: CPT

## 2025-07-11 NOTE — ASSESSMENT & PLAN NOTE
Orders:    MRSA Screen by PCR; Future    EKG 12 Lead; Future    CBC With Differential With Platelet; Future    Basic Metabolic Panel (8); Future    Hemoglobin A1C    Urinalysis with Culture Reflex; Future

## 2025-07-11 NOTE — PROGRESS NOTES
Subjective:   Radha Guallpa is a 50 year old female who presents for Pre-Op Exam (Pt is having surgery hip on 7/28 and needs clearance. ).     HPI   Radha Guallpa is a 50 year old female who presents for a pre-operative physical exam. Patient is to have right anterior total hip arthroplasty, to be done by Dr. Tania Barrera at [facility] on 07/28/2025. Pt has the following conditions: primary osteoarthritis of both hips, obesity, and sleep disturbance.     History/Other:      Chief Complaint Reviewed and Verified  Nursing Notes Reviewed and   Verified  Tobacco Reviewed  Allergies Reviewed  Medications Reviewed    Problem List Reviewed  Medical History Reviewed  Surgical History   Reviewed  OB Status Reviewed  Family History Reviewed  Social History   Reviewed           Tobacco:  She smoked tobacco in the past but quit greater than 12 months ago.  Tobacco Use[1]       Current Medications[2]    Allergies[3]      Review of Systems   Constitutional: Negative.  Negative for activity change and fatigue.   HENT: Negative.  Negative for congestion, ear pain, rhinorrhea and sneezing.    Eyes: Negative.  Negative for redness.   Respiratory: Negative.  Negative for cough, shortness of breath and wheezing.    Cardiovascular: Negative.  Negative for chest pain.   Gastrointestinal: Negative.  Negative for abdominal pain, constipation, diarrhea, nausea and vomiting.   Endocrine: Negative.    Genitourinary: Negative.  Negative for difficulty urinating and frequency.   Musculoskeletal:  Positive for arthralgias and myalgias. Negative for back pain and joint swelling.   Skin: Negative.  Negative for rash.   Allergic/Immunologic: Negative.    Neurological: Negative.  Negative for dizziness, syncope, light-headedness and headaches.   Hematological: Negative.    Psychiatric/Behavioral: Negative.           Objective:   /73 (BP Location: Right arm, Patient Position: Sitting, Cuff Size: large)    Pulse 62   Ht 5' 4\" (1.626 m)   Wt 232 lb (105.2 kg)   LMP 03/01/2022   SpO2 96%   BMI 39.82 kg/m²  Estimated body mass index is 39.82 kg/m² as calculated from the following:    Height as of this encounter: 5' 4\" (1.626 m).    Weight as of this encounter: 232 lb (105.2 kg).      Physical Exam  Vitals and nursing note reviewed.   Constitutional:       Appearance: Normal appearance. She is obese.   HENT:      Head: Normocephalic and atraumatic.      Right Ear: Tympanic membrane normal.      Left Ear: Tympanic membrane normal.      Nose: Nose normal.      Mouth/Throat:      Mouth: Mucous membranes are moist.      Pharynx: Oropharynx is clear.   Eyes:      Extraocular Movements: Extraocular movements intact.      Conjunctiva/sclera: Conjunctivae normal.      Pupils: Pupils are equal, round, and reactive to light.   Cardiovascular:      Rate and Rhythm: Normal rate and regular rhythm.      Pulses: Normal pulses.      Heart sounds: Normal heart sounds.   Pulmonary:      Effort: Pulmonary effort is normal.      Breath sounds: Normal breath sounds.   Abdominal:      General: Abdomen is flat. Bowel sounds are normal.      Palpations: Abdomen is soft.   Musculoskeletal:      Cervical back: Normal range of motion and neck supple.      Right hip: Decreased range of motion.      Left hip: Decreased range of motion.   Skin:     General: Skin is warm and dry.   Neurological:      General: No focal deficit present.      Mental Status: She is alert and oriented to person, place, and time. Mental status is at baseline.   Psychiatric:         Mood and Affect: Mood normal.         Behavior: Behavior normal.         Thought Content: Thought content normal.         Judgment: Judgment normal.           Assessment & Plan:     Assessment & Plan  Preop examination  Patient has no significant history of cardiac or pulmoniary conditions Patient is a good surgical candidate, pending laboratory results. Patient is medically cleared for  surgery.    Orders:    MRSA Screen by PCR; Future    EKG 12 Lead; Future    CBC With Differential With Platelet; Future    Basic Metabolic Panel (8); Future    Hemoglobin A1C    Urinalysis with Culture Reflex; Future    Osteoarthritis of right hip, unspecified osteoarthritis type    Orders:    MRSA Screen by PCR; Future    EKG 12 Lead; Future    CBC With Differential With Platelet; Future    Basic Metabolic Panel (8); Future    Hemoglobin A1C    Urinalysis with Culture Reflex; Future     Medication use, effects and side effects discussed in detail with patient. The patient indicated understanding of the diagnosis and agreed with the plan of care.    Return in about 1 week (around 7/18/2025) for Annual Physical.    PAULO Collins         [1]   Social History  Tobacco Use   Smoking Status Former    Current packs/day: 1.00    Types: Cigarettes   Smokeless Tobacco Former   [2]   Current Outpatient Medications   Medication Sig Dispense Refill    FAMOTIDINE OR Take by mouth.      acetaminophen 325 MG Oral Tab Take 2 tablets (650 mg total) by mouth every 4 to 6 hours as needed for Pain or Fever. 40 tablet 0   [3] No Known Allergies

## 2025-07-13 LAB — MRSA DNA SPEC QL NAA+PROBE: NEGATIVE

## 2025-07-15 ENCOUNTER — HOSPITAL ENCOUNTER (OUTPATIENT)
Dept: GENERAL RADIOLOGY | Facility: HOSPITAL | Age: 50
Discharge: HOME | End: 2025-07-15
Payer: COMMERCIAL

## 2025-07-15 ENCOUNTER — OFFICE VISIT (OUTPATIENT)
Dept: FAMILY MEDICINE CLINIC | Facility: CLINIC | Age: 50
End: 2025-07-15

## 2025-07-15 ENCOUNTER — LAB ENCOUNTER (OUTPATIENT)
Dept: LAB | Age: 50
End: 2025-07-15
Payer: COMMERCIAL

## 2025-07-15 VITALS
HEIGHT: 64 IN | HEART RATE: 67 BPM | TEMPERATURE: 97 F | RESPIRATION RATE: 20 BRPM | BODY MASS INDEX: 39.27 KG/M2 | SYSTOLIC BLOOD PRESSURE: 105 MMHG | WEIGHT: 230 LBS | OXYGEN SATURATION: 97 % | DIASTOLIC BLOOD PRESSURE: 72 MMHG

## 2025-07-15 DIAGNOSIS — E66.09 CLASS 2 OBESITY DUE TO EXCESS CALORIES WITHOUT SERIOUS COMORBIDITY WITH BODY MASS INDEX (BMI) OF 39.0 TO 39.9 IN ADULT: ICD-10-CM

## 2025-07-15 DIAGNOSIS — E55.9 VITAMIN D DEFICIENCY: ICD-10-CM

## 2025-07-15 DIAGNOSIS — R94.31 ABNORMAL EKG: ICD-10-CM

## 2025-07-15 DIAGNOSIS — M16.11 OSTEOARTHRITIS OF RIGHT HIP, UNSPECIFIED OSTEOARTHRITIS TYPE: ICD-10-CM

## 2025-07-15 DIAGNOSIS — Z00.01 ENCOUNTER FOR GENERAL ADULT MEDICAL EXAMINATION WITH ABNORMAL FINDINGS: ICD-10-CM

## 2025-07-15 DIAGNOSIS — Z12.4 SCREENING FOR CERVICAL CANCER: ICD-10-CM

## 2025-07-15 DIAGNOSIS — E66.812 CLASS 2 OBESITY DUE TO EXCESS CALORIES WITHOUT SERIOUS COMORBIDITY WITH BODY MASS INDEX (BMI) OF 39.0 TO 39.9 IN ADULT: ICD-10-CM

## 2025-07-15 DIAGNOSIS — Z00.01 ENCOUNTER FOR GENERAL ADULT MEDICAL EXAMINATION WITH ABNORMAL FINDINGS: Primary | ICD-10-CM

## 2025-07-15 LAB
ALBUMIN SERPL-MCNC: 4.7 G/DL (ref 3.2–4.8)
ALBUMIN/GLOB SERPL: 2 {RATIO} (ref 1–2)
ALP LIVER SERPL-CCNC: 79 U/L (ref 39–100)
ALT SERPL-CCNC: 13 U/L (ref 10–49)
ANION GAP SERPL CALC-SCNC: 7 MMOL/L (ref 0–18)
AST SERPL-CCNC: 12 U/L (ref ?–34)
BILIRUB SERPL-MCNC: 0.3 MG/DL (ref 0.3–1.2)
BUN BLD-MCNC: 16 MG/DL (ref 9–23)
BUN/CREAT SERPL: 19.8 (ref 10–20)
CALCIUM BLD-MCNC: 9.4 MG/DL (ref 8.7–10.4)
CHLORIDE SERPL-SCNC: 105 MMOL/L (ref 98–112)
CHOLEST SERPL-MCNC: 181 MG/DL (ref ?–200)
CO2 SERPL-SCNC: 26 MMOL/L (ref 21–32)
CREAT BLD-MCNC: 0.81 MG/DL (ref 0.55–1.02)
EGFRCR SERPLBLD CKD-EPI 2021: 88 ML/MIN/1.73M2 (ref 60–?)
FASTING PATIENT LIPID ANSWER: YES
FASTING STATUS PATIENT QL REPORTED: YES
GLOBULIN PLAS-MCNC: 2.4 G/DL (ref 2–3.5)
GLUCOSE BLD-MCNC: 96 MG/DL (ref 70–99)
HDLC SERPL-MCNC: 68 MG/DL (ref 40–59)
LDLC SERPL CALC-MCNC: 99 MG/DL (ref ?–100)
NONHDLC SERPL-MCNC: 113 MG/DL (ref ?–130)
OSMOLALITY SERPL CALC.SUM OF ELEC: 287 MOSM/KG (ref 275–295)
POTASSIUM SERPL-SCNC: 4.3 MMOL/L (ref 3.5–5.1)
PROT SERPL-MCNC: 7.1 G/DL (ref 5.7–8.2)
SODIUM SERPL-SCNC: 138 MMOL/L (ref 136–145)
TRIGL SERPL-MCNC: 76 MG/DL (ref 30–149)
TSI SER-ACNC: 1.24 UIU/ML (ref 0.55–4.78)
VIT D+METAB SERPL-MCNC: 48.7 NG/ML (ref 30–100)
VLDLC SERPL CALC-MCNC: 13 MG/DL (ref 0–30)

## 2025-07-15 PROCEDURE — 82306 VITAMIN D 25 HYDROXY: CPT

## 2025-07-15 PROCEDURE — 99396 PREV VISIT EST AGE 40-64: CPT

## 2025-07-15 PROCEDURE — 80061 LIPID PANEL: CPT

## 2025-07-15 PROCEDURE — 3008F BODY MASS INDEX DOCD: CPT

## 2025-07-15 PROCEDURE — 36415 COLL VENOUS BLD VENIPUNCTURE: CPT

## 2025-07-15 PROCEDURE — 84443 ASSAY THYROID STIM HORMONE: CPT

## 2025-07-15 PROCEDURE — 80053 COMPREHEN METABOLIC PANEL: CPT

## 2025-07-15 PROCEDURE — 71046 X-RAY EXAM CHEST 2 VIEWS: CPT

## 2025-07-15 PROCEDURE — 3074F SYST BP LT 130 MM HG: CPT

## 2025-07-15 PROCEDURE — 3078F DIAST BP <80 MM HG: CPT

## 2025-07-15 NOTE — PROGRESS NOTES
Subjective:   Radha Guallpa is a 50 year old female who presents for Physical, Pap, and Referral.     HPI   Patient is here for routine general physical and Pap smear. No changes to chronic medical problems. Patient has no family history of gynecological cancers or issues. No history of abnormal pap smear. No breast issues.   Patient is requesting blood testing. Diet and exercise have been fair.  Past medical history, family history, and social history were reviewed.  Patient's LMP: Patient is menopauseal her lmp 2019  Patient is sexually active with one partners male    New concerns discussed during this visit:     Patient scheduled to have surgery for her right hip on 07/28/25.     History/Other:      Chief Complaint Reviewed and Verified  No Further Nursing Notes to   Review  Tobacco Reviewed  Allergies Reviewed  Medications Reviewed    Problem List Reviewed  Medical History Reviewed  Surgical History   Reviewed  OB Status Reviewed  Family History Reviewed  Social History   Reviewed           Tobacco:  She smoked tobacco in the past but quit greater than 12 months ago.  Tobacco Use[1]       Current Medications[2]    Allergies[3]    Depression Screening (PHQ-2/PHQ-9): Over the LAST 2 WEEKS   Little interest or pleasure in doing things: Not at all    Feeling down, depressed, or hopeless: Not at all    PHQ-2 SCORE: 0           Review of Systems   Constitutional: Negative.  Negative for activity change and fatigue.   HENT: Negative.  Negative for congestion, ear pain, rhinorrhea and sneezing.    Eyes: Negative.  Negative for redness.   Respiratory: Negative.  Negative for cough, shortness of breath and wheezing.    Cardiovascular: Negative.  Negative for chest pain.   Gastrointestinal: Negative.  Negative for abdominal pain, constipation, diarrhea, nausea and vomiting.   Endocrine: Negative.    Genitourinary: Negative.  Negative for difficulty urinating and frequency.   Musculoskeletal:  Positive  for arthralgias and myalgias. Negative for joint swelling.   Skin: Negative.  Negative for rash.   Allergic/Immunologic: Negative.    Neurological: Negative.  Negative for dizziness, syncope, light-headedness and headaches.   Hematological: Negative.    Psychiatric/Behavioral: Negative.           Objective:   /72   Pulse 67   Temp 96.9 °F (36.1 °C) (Temporal)   Resp 20   Ht 5' 4\" (1.626 m)   Wt 230 lb (104.3 kg)   LMP 03/01/2022   SpO2 97%   BMI 39.48 kg/m²  Estimated body mass index is 39.48 kg/m² as calculated from the following:    Height as of this encounter: 5' 4\" (1.626 m).    Weight as of this encounter: 230 lb (104.3 kg).      Physical Exam  Vitals and nursing note reviewed.   Constitutional:       Appearance: Normal appearance. She is obese.   HENT:      Head: Normocephalic and atraumatic.      Right Ear: Tympanic membrane normal.      Left Ear: Tympanic membrane normal.      Nose: Nose normal.      Mouth/Throat:      Mouth: Mucous membranes are moist.      Pharynx: Oropharynx is clear.   Eyes:      Extraocular Movements: Extraocular movements intact.      Conjunctiva/sclera: Conjunctivae normal.      Pupils: Pupils are equal, round, and reactive to light.   Cardiovascular:      Rate and Rhythm: Normal rate and regular rhythm.      Pulses: Normal pulses.      Heart sounds: Normal heart sounds.   Pulmonary:      Effort: Pulmonary effort is normal.      Breath sounds: Normal breath sounds.   Abdominal:      General: Abdomen is flat. Bowel sounds are normal.      Palpations: Abdomen is soft.   Musculoskeletal:      Cervical back: Normal range of motion and neck supple.      Right hip: Decreased range of motion.      Left hip: Decreased range of motion.   Skin:     General: Skin is warm and dry.   Neurological:      General: No focal deficit present.      Mental Status: She is alert and oriented to person, place, and time. Mental status is at baseline.   Psychiatric:         Mood and Affect: Mood  normal.         Behavior: Behavior normal.         Thought Content: Thought content normal.         Judgment: Judgment normal.           Assessment & Plan:     Assessment & Plan  Encounter for general adult medical examination with abnormal findings  -Exam is unremarkable  -Screening tests/lab were discussed   -Discussed with the patient about age appropriate screening and immunization.  -Advised healthy lifestyle with regular exercise and modification of the diet  -Advised 30 minutes of aerobic exercise or weightbearing exercise daily for at least 5 times a week.  -Advised to increase the intake of vegetables, fibers and lean protein.   -Avoid high fat, high cholesterol, high carb diet.  -Avoid processed, frozen food and sweetened beverages.  -Advised to call for any new problems and follow up for further management after testings have been done   Orders:    Comp Metabolic Panel (14); Future    Lipid Panel; Future    TSH W Reflex To Free T4    Osteoarthritis of right hip, unspecified osteoarthritis type  -Patient is scheduled to have hip replacement surgery       Class 2 obesity due to excess calories without serious comorbidity with body mass index (BMI) of 39.0 to 39.9 in adult  Discussed with the patient obesity complications including risk for cardiovascular risk events, also degenerative arthritis of joints and spine.  A healthy BMI is 25 and less.  Try to decrease white flour products and carbohydrates such as less potatos, rice, tortillas, bread, pasta, etc. Eat smaller portions and avoid eating late at night, especially before bed. Try to not skip meals as many times your body will feel that you are trying to starve yourself and therefore slow down your metabolism and  prevent you from losing weight. Also try and do some sort of aerobic exercise, such as brisk walks for 30 minutes 4-5 times per week.   Orders:    Comp Metabolic Panel (14); Future    Lipid Panel; Future    TSH W Reflex To Free  T4    Screening for cervical cancer    Orders:    ThinPrep PAP Smear; Future    Hpv Dna  High Risk , Thin Prep Collect; Future    Vitamin D deficiency    Orders:    Vitamin D; Future           Medication use, effects and side effects discussed in detail with patient. The patient  indicated understanding of the diagnosis and agreed with the plan of care.    No follow-ups on file.    PAULO Collins         [1]   Social History  Tobacco Use   Smoking Status Former    Current packs/day: 0.00    Types: Cigarettes    Quit date: 2025    Years since quittin.0   Smokeless Tobacco Former   [2]   Current Outpatient Medications   Medication Sig Dispense Refill    FAMOTIDINE OR Take by mouth.      acetaminophen 325 MG Oral Tab Take 2 tablets (650 mg total) by mouth every 4 to 6 hours as needed for Pain or Fever. 40 tablet 0   [3] No Known Allergies

## 2025-07-15 NOTE — ASSESSMENT & PLAN NOTE
Discussed with the patient obesity complications including risk for cardiovascular risk events, also degenerative arthritis of joints and spine.  A healthy BMI is 25 and less.  Try to decrease white flour products and carbohydrates such as less potatos, rice, tortillas, bread, pasta, etc. Eat smaller portions and avoid eating late at night, especially before bed. Try to not skip meals as many times your body will feel that you are trying to starve yourself and therefore slow down your metabolism and  prevent you from losing weight. Also try and do some sort of aerobic exercise, such as brisk walks for 30 minutes 4-5 times per week.   Orders:    Comp Metabolic Panel (14); Future    Lipid Panel; Future    TSH W Reflex To Free T4

## 2025-07-16 LAB — HPV E6+E7 MRNA CVX QL NAA+PROBE: NEGATIVE

## 2025-07-22 ENCOUNTER — LAB ENCOUNTER (OUTPATIENT)
Dept: LAB | Facility: HOSPITAL | Age: 50
End: 2025-07-22
Attending: ORTHOPAEDIC SURGERY
Payer: COMMERCIAL

## 2025-07-22 ENCOUNTER — OFFICE VISIT (OUTPATIENT)
Facility: CLINIC | Age: 50
End: 2025-07-22
Payer: COMMERCIAL

## 2025-07-22 DIAGNOSIS — Z96.641 S/P HIP REPLACEMENT, RIGHT: Primary | ICD-10-CM

## 2025-07-22 DIAGNOSIS — M16.11 OSTEOARTHRITIS OF RIGHT HIP, UNSPECIFIED OSTEOARTHRITIS TYPE: ICD-10-CM

## 2025-07-22 LAB
ANTIBODY SCREEN: NEGATIVE
RH BLOOD TYPE: POSITIVE

## 2025-07-22 PROCEDURE — 86901 BLOOD TYPING SEROLOGIC RH(D): CPT | Performed by: ORTHOPAEDIC SURGERY

## 2025-07-22 PROCEDURE — 86900 BLOOD TYPING SEROLOGIC ABO: CPT | Performed by: ORTHOPAEDIC SURGERY

## 2025-07-22 PROCEDURE — 99214 OFFICE O/P EST MOD 30 MIN: CPT | Performed by: ORTHOPAEDIC SURGERY

## 2025-07-22 PROCEDURE — 86850 RBC ANTIBODY SCREEN: CPT | Performed by: ORTHOPAEDIC SURGERY

## 2025-07-22 RX ORDER — ASPIRIN 81 MG/1
81 TABLET ORAL 2 TIMES DAILY
Qty: 60 TABLET | Refills: 0 | Status: SHIPPED | OUTPATIENT
Start: 2025-07-22

## 2025-07-22 RX ORDER — FERROUS SULFATE 325(65) MG
325 TABLET ORAL
Qty: 30 TABLET | Refills: 0 | Status: SHIPPED | OUTPATIENT
Start: 2025-07-22

## 2025-07-22 RX ORDER — AMOXICILLIN 250 MG
1 CAPSULE ORAL DAILY
Qty: 30 TABLET | Refills: 0 | Status: SHIPPED | OUTPATIENT
Start: 2025-07-22

## 2025-07-22 RX ORDER — OXYCODONE HYDROCHLORIDE 5 MG/1
5 TABLET ORAL EVERY 6 HOURS PRN
Qty: 28 TABLET | Refills: 0 | Status: SHIPPED | OUTPATIENT
Start: 2025-07-22

## 2025-07-22 RX ORDER — CELECOXIB 200 MG/1
200 CAPSULE ORAL 2 TIMES DAILY
Qty: 60 CAPSULE | Refills: 0 | Status: SHIPPED | OUTPATIENT
Start: 2025-07-22

## 2025-07-22 NOTE — PROGRESS NOTES
Radha Guallpa is a 50 year old female being seen in the office today for a preoperative visit for their RTHA.    HPI  Ms. Guallpa has obtained:  Medical clearance: Yes  Dental clearance: Yes  Preop labs: Yes  Presurgical Imaging/Templating: Yes  Joint class: No  Support at home: Yes, daughter and boyfriend    Any changes to hip symptoms: No  Any changes constitutional symptoms:  No         History:  Past Medical History[1]  Past Surgical History[2]  Family History[3]  Family Status   Relation Status    Fa     Other (Not Specified)    MGMA (Not Specified)    Mat Aunt (Not Specified)    PGFA (Not Specified)    Mo Alive    MGFA (Not Specified)     Social History     Occupational History    Not on file   Tobacco Use    Smoking status: Former     Current packs/day: 0.00     Types: Cigarettes     Quit date: 2025     Years since quittin.0    Smokeless tobacco: Former   Vaping Use    Vaping status: Former   Substance and Sexual Activity    Alcohol use: Yes     Alcohol/week: 0.0 standard drinks of alcohol     Comment: 2 beers and 2 margaritas occasionally    Drug use: Never    Sexual activity: Yes     Birth control/protection: Condom       Medications:  Current Medications[4]    Allergies:  Allergies[5]    Review of Systems  A comprehensive review of systems was completed and is negative unless noted above or in the HPI.    Physical Exam  LMP 2022   There is no height or weight on file to calculate BMI.    Constitutional: The patient appears well-developed and well-nourished, in no apparent distress.   Psychiatric: The patient demonstrates good comprehension, judgment and decision making. Normal mood and affect.  Eyes: PER and EOM are normal.  ENT: Hearing appropriate for normal conversation.  .Cardiovascular: The patient has symmetric pulses, 2+.  Distal extremity is warm and well perfused with good capillary refill.  Respiratory:  The patient is breathing comfortably without increased  respiratory effort or use of accessory muscles.  Hematologic/Lymphatic: No lymphangitis. There is no appreciable enlargement of lymph nodes. No calf swelling, calf non-tender, negative Hansen's sign. No edema.  Skin: No wounds or ulcers. No hypertrophic scarring.  Neck: FROM without pain.  MSK:  Gait: Antalgic to right       Right hip           Skin: Intact, no lesions or intertrigo in the groin region.         Deformity: None         Tenderness: None         Range of Motion:       Extension: 0     Flexion Contracture: 0     Flexion: 80     Internal Rotation: 5     External Rotation: 20     Abduction: 15     Adduction: 15         Muscle Strength       Abduction: 5/5     Adduction: 5/5     Flexion: 5/5          Gait: Antalgic   Trendelenburg Lurch Negative   Trendelenburg Sign Negative   Stinchfield: Positive   Tiffanie Test: Negative   Deshawn Test: Negative   Anterior Impingement: Positive   Posterior Impingement: Negative   Leg Length Discrepancy Negative         Motor: intact   Sensory: intact   Vascular: intact        Labs:  Lab Results   Component Value Date    WBC 9.1 07/11/2025    HGB 13.1 07/11/2025    HCT 41.3 07/11/2025    .0 07/11/2025     Lab Results   Component Value Date    ALB 4.7 07/15/2025    A1C 5.6 07/11/2025     Lab Results   Component Value Date    GLU 96 07/15/2025    GLU 93 07/11/2025     (H) 06/16/2023       Imaging:  None new    Assessment and Plan  Assessment & Plan  S/P hip replacement, right    Orders:    aspirin 81 MG Oral Tab EC; Take 1 tablet (81 mg total) by mouth in the morning and 1 tablet (81 mg total) in the evening.    celecoxib (CELEBREX) 200 MG Oral Cap; Take 1 capsule (200 mg total) by mouth 2 (two) times daily.    Ferrous Sulfate 325 (65 Fe) MG Oral Tab; Take 1 tablet (325 mg total) by mouth daily with breakfast.    oxyCODONE 5 MG Oral Tab; Take 1 tablet (5 mg total) by mouth every 6 (six) hours as needed for Pain.    sennosides-docusate (SENNA S) 8.6-50 MG Oral Tab;  Take 1 tablet by mouth daily.    [facility]    Osteoarthritis of right hip, unspecified osteoarthritis type           The patient's history, physical examination and imaging studies are consistent with the diagnosis above. Options were discussed with the patient today, including continued conservative management vs surgical intervention. Given that the patient has attempted and failed nonoperative modalities, and continue to have functionally limiting pain that is negatively affecting quality of life, I recommend surgical intervention in the form of a total hip arthroplasty. They have been scheduled for this and are here today for a preoperative visit.    I had a lengthy discussion about total hip replacement.  The risks, benefits and alternatives of the surgery were discussed.  Risks include but are not limited to infection, DVT/PE, damage to surrounding nerves and vessels, bleeding, need for blood transfusion, injury to surrounding structures including nerves, vessels, ligaments, tendons, or bone, iatrogenic fractures, dislocation/instability, leg length inequality, loosening, polyethylene wear, osteolysis, continued pain, need for future surgeries, risks for anesthesia including MI, stroke, loss of limb, or loss of life.  Discussed postoperative rehabilitation as well as restrictions if any.  We discussed safe and responsible use of pain medications postoperatively.  Understanding these topics, the patient elected to proceed with surgery.    //Surgery scheduled for .  They have obtained the necessary clearances.  Labs were reviewed. Type and screen today.   Post op physical therapy ordered: Home PT  Post op medications ordered and medication guide provided for patient. Counseling on Hibiclens soap provided.   They can continue activity modification, ice, tylenol, and assistive device usage for symptomatic relief and work on home exercise program in anticipation for surgery.  They have  stopped all appropriate medications and supplements.  Activities: As tolerated  Follow-up: 3 weeks post surgery with xrays. Post op appointment confirmed.       No follow-ups on file.    Tania Barrera MD         [1]   Past Medical History:   Arthritis    Esophageal reflux    PONV (postoperative nausea and vomiting)    Torn meniscus    bilateral knee surgery    Visual impairment    Readers   [2]   Past Surgical History:  Procedure Laterality Date    Forearm/wrist surgery unlisted Left     plate/pins    Hysteroscopy  03/2022    with myosure--polyps    Knee surgery Bilateral     Other surgical history  2017    cold knife cervical conization   [3]   Family History  Problem Relation Age of Onset    Cancer Father 50        pancreatic    Other (Other) Father 50        pancreatic    Diabetes Other     Hypertension Maternal Grandmother     Other (Other) Maternal Grandmother 87        uterine    Ovarian Cancer Maternal Aunt 50    Diabetes Maternal Aunt     Other (Other) Paternal Grandfather 60        pancreatic    Diabetes Mother     Cancer Maternal Grandfather 67        pancreatic ca   [4]   Current Outpatient Medications   Medication Sig Dispense Refill    aspirin 81 MG Oral Tab EC Take 1 tablet (81 mg total) by mouth in the morning and 1 tablet (81 mg total) in the evening. 60 tablet 0    celecoxib (CELEBREX) 200 MG Oral Cap Take 1 capsule (200 mg total) by mouth 2 (two) times daily. 60 capsule 0    Ferrous Sulfate 325 (65 Fe) MG Oral Tab Take 1 tablet (325 mg total) by mouth daily with breakfast. 30 tablet 0    oxyCODONE 5 MG Oral Tab Take 1 tablet (5 mg total) by mouth every 6 (six) hours as needed for Pain. 28 tablet 0    sennosides-docusate (SENNA S) 8.6-50 MG Oral Tab Take 1 tablet by mouth daily. 30 tablet 0    FAMOTIDINE OR Take by mouth.      acetaminophen 325 MG Oral Tab Take 2 tablets (650 mg total) by mouth every 4 to 6 hours as needed for Pain or Fever. 40 tablet 0   [5] No Known Allergies

## 2025-07-22 NOTE — ASSESSMENT & PLAN NOTE
Orders:    aspirin 81 MG Oral Tab EC; Take 1 tablet (81 mg total) by mouth in the morning and 1 tablet (81 mg total) in the evening.    celecoxib (CELEBREX) 200 MG Oral Cap; Take 1 capsule (200 mg total) by mouth 2 (two) times daily.    Ferrous Sulfate 325 (65 Fe) MG Oral Tab; Take 1 tablet (325 mg total) by mouth daily with breakfast.    oxyCODONE 5 MG Oral Tab; Take 1 tablet (5 mg total) by mouth every 6 (six) hours as needed for Pain.    sennosides-docusate (SENNA S) 8.6-50 MG Oral Tab; Take 1 tablet by mouth daily.    [facility]

## 2025-07-24 ENCOUNTER — TELEPHONE (OUTPATIENT)
Dept: FAMILY MEDICINE CLINIC | Facility: CLINIC | Age: 50
End: 2025-07-24

## 2025-07-28 ENCOUNTER — APPOINTMENT (OUTPATIENT)
Dept: GENERAL RADIOLOGY | Facility: HOSPITAL | Age: 50
End: 2025-07-28
Attending: ORTHOPAEDIC SURGERY

## 2025-07-28 ENCOUNTER — ANESTHESIA (OUTPATIENT)
Dept: SURGERY | Facility: HOSPITAL | Age: 50
End: 2025-07-28
Payer: COMMERCIAL

## 2025-07-28 ENCOUNTER — HOSPITAL ENCOUNTER (OUTPATIENT)
Facility: HOSPITAL | Age: 50
Setting detail: HOSPITAL OUTPATIENT SURGERY
Discharge: HOME | End: 2025-07-28
Attending: ORTHOPAEDIC SURGERY | Admitting: ORTHOPAEDIC SURGERY

## 2025-07-28 ENCOUNTER — HOSPITAL ENCOUNTER (OUTPATIENT)
Facility: HOSPITAL | Age: 50
Setting detail: HOSPITAL OUTPATIENT SURGERY
Discharge: HOME HEALTH | End: 2025-07-28
Attending: ORTHOPAEDIC SURGERY | Admitting: ORTHOPAEDIC SURGERY

## 2025-07-28 ENCOUNTER — ANESTHESIA EVENT (OUTPATIENT)
Dept: SURGERY | Facility: HOSPITAL | Age: 50
End: 2025-07-28
Payer: COMMERCIAL

## 2025-07-28 VITALS
TEMPERATURE: 98 F | RESPIRATION RATE: 13 BRPM | SYSTOLIC BLOOD PRESSURE: 110 MMHG | HEIGHT: 64 IN | OXYGEN SATURATION: 100 % | WEIGHT: 237 LBS | DIASTOLIC BLOOD PRESSURE: 63 MMHG | HEART RATE: 72 BPM | BODY MASS INDEX: 40.46 KG/M2

## 2025-07-28 DIAGNOSIS — M16.11 OSTEOARTHRITIS OF RIGHT HIP, UNSPECIFIED OSTEOARTHRITIS TYPE: ICD-10-CM

## 2025-07-28 PROCEDURE — 72170 X-RAY EXAM OF PELVIS: CPT | Performed by: ORTHOPAEDIC SURGERY

## 2025-07-28 PROCEDURE — 97530 THERAPEUTIC ACTIVITIES: CPT

## 2025-07-28 PROCEDURE — 88305 TISSUE EXAM BY PATHOLOGIST: CPT | Performed by: ORTHOPAEDIC SURGERY

## 2025-07-28 PROCEDURE — 76000 FLUOROSCOPY <1 HR PHYS/QHP: CPT | Performed by: ORTHOPAEDIC SURGERY

## 2025-07-28 PROCEDURE — 88311 DECALCIFY TISSUE: CPT | Performed by: ORTHOPAEDIC SURGERY

## 2025-07-28 PROCEDURE — 97161 PT EVAL LOW COMPLEX 20 MIN: CPT

## 2025-07-28 DEVICE — IMPLANTABLE DEVICE: Type: IMPLANTABLE DEVICE | Site: HIP | Status: FUNCTIONAL

## 2025-07-28 RX ORDER — MORPHINE SULFATE 10 MG/ML
6 INJECTION, SOLUTION INTRAMUSCULAR; INTRAVENOUS EVERY 10 MIN PRN
Status: DISCONTINUED | OUTPATIENT
Start: 2025-07-28 | End: 2025-07-28

## 2025-07-28 RX ORDER — ACETAMINOPHEN 500 MG
1000 TABLET ORAL EVERY 8 HOURS SCHEDULED
Status: DISCONTINUED | OUTPATIENT
Start: 2025-07-28 | End: 2025-07-28

## 2025-07-28 RX ORDER — HYDROMORPHONE HYDROCHLORIDE 1 MG/ML
INJECTION, SOLUTION INTRAMUSCULAR; INTRAVENOUS; SUBCUTANEOUS AS NEEDED
Status: DISCONTINUED | OUTPATIENT
Start: 2025-07-28 | End: 2025-07-28 | Stop reason: SURG

## 2025-07-28 RX ORDER — SODIUM CHLORIDE, SODIUM LACTATE, POTASSIUM CHLORIDE, CALCIUM CHLORIDE 600; 310; 30; 20 MG/100ML; MG/100ML; MG/100ML; MG/100ML
INJECTION, SOLUTION INTRAVENOUS CONTINUOUS
Status: DISCONTINUED | OUTPATIENT
Start: 2025-07-28 | End: 2025-07-28

## 2025-07-28 RX ORDER — MIDAZOLAM HYDROCHLORIDE 1 MG/ML
INJECTION INTRAMUSCULAR; INTRAVENOUS AS NEEDED
Status: DISCONTINUED | OUTPATIENT
Start: 2025-07-28 | End: 2025-07-28 | Stop reason: SURG

## 2025-07-28 RX ORDER — TRANEXAMIC ACID 10 MG/ML
INJECTION, SOLUTION INTRAVENOUS AS NEEDED
Status: DISCONTINUED | OUTPATIENT
Start: 2025-07-28 | End: 2025-07-28 | Stop reason: SURG

## 2025-07-28 RX ORDER — NALOXONE HYDROCHLORIDE 0.4 MG/ML
80 INJECTION, SOLUTION INTRAMUSCULAR; INTRAVENOUS; SUBCUTANEOUS AS NEEDED
Status: DISCONTINUED | OUTPATIENT
Start: 2025-07-28 | End: 2025-07-28

## 2025-07-28 RX ORDER — HYDROMORPHONE HYDROCHLORIDE 1 MG/ML
0.4 INJECTION, SOLUTION INTRAMUSCULAR; INTRAVENOUS; SUBCUTANEOUS EVERY 5 MIN PRN
Status: DISCONTINUED | OUTPATIENT
Start: 2025-07-28 | End: 2025-07-28

## 2025-07-28 RX ORDER — MORPHINE SULFATE 4 MG/ML
2 INJECTION, SOLUTION INTRAMUSCULAR; INTRAVENOUS EVERY 10 MIN PRN
Status: DISCONTINUED | OUTPATIENT
Start: 2025-07-28 | End: 2025-07-28

## 2025-07-28 RX ORDER — CELECOXIB 200 MG/1
400 CAPSULE ORAL ONCE
Status: COMPLETED | OUTPATIENT
Start: 2025-07-28 | End: 2025-07-28

## 2025-07-28 RX ORDER — ACETAMINOPHEN 500 MG
1000 TABLET ORAL ONCE
Status: DISCONTINUED | OUTPATIENT
Start: 2025-07-28 | End: 2025-07-28 | Stop reason: HOSPADM

## 2025-07-28 RX ORDER — MORPHINE SULFATE 4 MG/ML
4 INJECTION, SOLUTION INTRAMUSCULAR; INTRAVENOUS EVERY 10 MIN PRN
Status: DISCONTINUED | OUTPATIENT
Start: 2025-07-28 | End: 2025-07-28

## 2025-07-28 RX ORDER — HYDROMORPHONE HYDROCHLORIDE 1 MG/ML
0.2 INJECTION, SOLUTION INTRAMUSCULAR; INTRAVENOUS; SUBCUTANEOUS EVERY 5 MIN PRN
Status: DISCONTINUED | OUTPATIENT
Start: 2025-07-28 | End: 2025-07-28

## 2025-07-28 RX ORDER — LIDOCAINE HYDROCHLORIDE 10 MG/ML
INJECTION, SOLUTION INFILTRATION; PERINEURAL
Status: COMPLETED | OUTPATIENT
Start: 2025-07-28 | End: 2025-07-28

## 2025-07-28 RX ORDER — OXYCODONE HYDROCHLORIDE 5 MG/1
10 TABLET ORAL EVERY 4 HOURS PRN
Status: DISCONTINUED | OUTPATIENT
Start: 2025-07-28 | End: 2025-07-28

## 2025-07-28 RX ORDER — FAMOTIDINE 10 MG/ML
20 INJECTION, SOLUTION INTRAVENOUS ONCE
Status: COMPLETED | OUTPATIENT
Start: 2025-07-28 | End: 2025-07-28

## 2025-07-28 RX ORDER — LIDOCAINE HYDROCHLORIDE 10 MG/ML
INJECTION, SOLUTION EPIDURAL; INFILTRATION; INTRACAUDAL; PERINEURAL AS NEEDED
Status: DISCONTINUED | OUTPATIENT
Start: 2025-07-28 | End: 2025-07-28 | Stop reason: SURG

## 2025-07-28 RX ORDER — KETOROLAC TROMETHAMINE 30 MG/ML
30 INJECTION, SOLUTION INTRAMUSCULAR; INTRAVENOUS ONCE
Status: COMPLETED | OUTPATIENT
Start: 2025-07-28 | End: 2025-07-28

## 2025-07-28 RX ORDER — METOCLOPRAMIDE 10 MG/1
10 TABLET ORAL ONCE
Status: COMPLETED | OUTPATIENT
Start: 2025-07-28 | End: 2025-07-28

## 2025-07-28 RX ORDER — OXYCODONE HYDROCHLORIDE 5 MG/1
5 TABLET ORAL EVERY 4 HOURS PRN
Status: DISCONTINUED | OUTPATIENT
Start: 2025-07-28 | End: 2025-07-28

## 2025-07-28 RX ORDER — METOCLOPRAMIDE HYDROCHLORIDE 5 MG/ML
10 INJECTION INTRAMUSCULAR; INTRAVENOUS ONCE
Status: COMPLETED | OUTPATIENT
Start: 2025-07-28 | End: 2025-07-28

## 2025-07-28 RX ORDER — HYDROMORPHONE HYDROCHLORIDE 1 MG/ML
0.6 INJECTION, SOLUTION INTRAMUSCULAR; INTRAVENOUS; SUBCUTANEOUS EVERY 5 MIN PRN
Status: DISCONTINUED | OUTPATIENT
Start: 2025-07-28 | End: 2025-07-28

## 2025-07-28 RX ORDER — FAMOTIDINE 20 MG/1
20 TABLET, FILM COATED ORAL ONCE
Status: COMPLETED | OUTPATIENT
Start: 2025-07-28 | End: 2025-07-28

## 2025-07-28 RX ORDER — ONDANSETRON 2 MG/ML
4 INJECTION INTRAMUSCULAR; INTRAVENOUS EVERY 6 HOURS PRN
Status: DISCONTINUED | OUTPATIENT
Start: 2025-07-28 | End: 2025-07-28

## 2025-07-28 RX ADMIN — LIDOCAINE HYDROCHLORIDE 5 ML: 10 INJECTION, SOLUTION INFILTRATION; PERINEURAL at 07:38:00

## 2025-07-28 RX ADMIN — TRANEXAMIC ACID 1000 MG: 10 INJECTION, SOLUTION INTRAVENOUS at 07:32:00

## 2025-07-28 RX ADMIN — HYDROMORPHONE HYDROCHLORIDE 0.3 MG: 1 INJECTION, SOLUTION INTRAMUSCULAR; INTRAVENOUS; SUBCUTANEOUS at 09:23:00

## 2025-07-28 RX ADMIN — SODIUM CHLORIDE, SODIUM LACTATE, POTASSIUM CHLORIDE, CALCIUM CHLORIDE: 600; 310; 30; 20 INJECTION, SOLUTION INTRAVENOUS at 07:28:00

## 2025-07-28 RX ADMIN — LIDOCAINE HYDROCHLORIDE 20 MG: 10 INJECTION, SOLUTION EPIDURAL; INFILTRATION; INTRACAUDAL; PERINEURAL at 07:47:00

## 2025-07-28 RX ADMIN — HYDROMORPHONE HYDROCHLORIDE 0.2 MG: 1 INJECTION, SOLUTION INTRAMUSCULAR; INTRAVENOUS; SUBCUTANEOUS at 09:20:00

## 2025-07-28 RX ADMIN — MIDAZOLAM HYDROCHLORIDE 2 MG: 1 INJECTION INTRAMUSCULAR; INTRAVENOUS at 07:31:00

## 2025-07-28 RX ADMIN — TRANEXAMIC ACID 1000 MG: 10 INJECTION, SOLUTION INTRAVENOUS at 09:03:00

## 2025-07-28 NOTE — ANESTHESIA PREPROCEDURE EVALUATION
Anesthesia PreOp Note    HPI:     Radha Guallpa is a 50 year old female who presents for preoperative consultation requested by: Tania Barrera MD    Date of Surgery: 7/28/2025    Procedure(s):  Right anterior total hip arthroplasty  Indication: Osteoarthritis of right hip, unspecified osteoarthritis type [M16.11]    Relevant Problems   No relevant active problems       NPO:  Last Liquid Consumption Date: 07/28/25  Last Liquid Consumption Time: 0215 (pre surgery gatorade)  Last Solid Consumption Date: 07/27/25  Last Solid Consumption Time: 1830  Last Liquid Consumption Date: 07/28/25          History Review:  Patient Active Problem List    Diagnosis Date Noted    S/P hip replacement, right 07/22/2025    Osteoarthritis of right hip 04/24/2019    Primary osteoarthritis of both hips 03/13/2019    Poor posture 03/13/2019    Obesity, unspecified 03/13/2019    Hamstring tightness of both lower extremities 03/13/2019    Sleep disturbance 03/13/2019    Cervical dysplasia 01/25/2017       Past Medical History[1]    Past Surgical History[2]    Prescriptions Prior to Admission[3]  Current Medications and Prescriptions Ordered in Epic[4]    Allergies[5]    Family History[6]  Social Hx on file[7]    Available pre-op labs reviewed.  Lab Results   Component Value Date    WBC 9.1 07/11/2025    RBC 4.99 07/11/2025    HGB 13.1 07/11/2025    HCT 41.3 07/11/2025    MCV 82.8 07/11/2025    MCH 26.3 07/11/2025    MCHC 31.7 07/11/2025    RDW 15.3 (H) 07/11/2025    .0 07/11/2025     Lab Results   Component Value Date     07/15/2025    K 4.3 07/15/2025     07/15/2025    CO2 26.0 07/15/2025    BUN 16 07/15/2025    CREATSERUM 0.81 07/15/2025    GLU 96 07/15/2025    CA 9.4 07/15/2025          Vital Signs:  Body mass index is 40.68 kg/m².   height is 1.626 m (5' 4\") and weight is 107.5 kg (237 lb). Her oral temperature is 97.4 °F (36.3 °C). Her blood pressure is 122/73 and her pulse is 66. Her respiration is 14  and oxygen saturation is 96%.   Vitals:    07/23/25 1547 07/28/25 0634   BP:  122/73   Pulse:  66   Resp:  14   Temp:  97.4 °F (36.3 °C)   TempSrc:  Oral   SpO2:  96%   Weight: 105.2 kg (232 lb) 107.5 kg (237 lb)   Height: 1.626 m (5' 4\")         Anesthesia Evaluation     Patient summary reviewed and Nursing notes reviewed    No history of anesthetic complications   Airway   Mallampati: II  Neck ROM: full  Dental      Pulmonary - negative ROS and normal exam   Cardiovascular - negative ROS and normal exam    Neuro/Psych - negative ROS     GI/Hepatic/Renal    (+) GERD    Endo/Other - negative ROS   Abdominal   (+) obese                 Anesthesia Plan:   ASA:  3  Plan:   MAC and spinal  Informed Consent Plan and Risks Discussed With:  Patient  Discussed plan with:  CRNA      I have informed Radha Guallpa and/or legal guardian or family member of the nature of the anesthetic plan, benefits, risks including possible dental damage if relevant, major complications, and any alternative forms of anesthetic management.   All of the patient's questions were answered to the best of my ability. The patient desires the anesthetic management as planned.  Apolinar Wilson MD  7/28/2025 7:18 AM  Present on Admission:  **None**           [1]   Past Medical History:   Arthritis    Esophageal reflux    Osteoarthritis    PONV (postoperative nausea and vomiting)    Torn meniscus    bilateral knee surgery    Visual impairment    Readers   [2]   Past Surgical History:  Procedure Laterality Date    Forearm/wrist surgery unlisted Left     plate/pins    Hysteroscopy  03/2022    with myosure--polyps    Knee surgery Bilateral     Other surgical history  2017    cold knife cervical conization   [3]   Medications Prior to Admission   Medication Sig Dispense Refill Last Dose/Taking    FAMOTIDINE OR Take 10 mg by mouth as needed.   Taking As Needed    acetaminophen 325 MG Oral Tab Take 2 tablets (650 mg total) by mouth every 4 to 6  hours as needed for Pain or Fever. 40 tablet 0 7/28/2025 at  5:00 AM    aspirin 81 MG Oral Tab EC Take 1 tablet (81 mg total) by mouth in the morning and 1 tablet (81 mg total) in the evening. 60 tablet 0     celecoxib (CELEBREX) 200 MG Oral Cap Take 1 capsule (200 mg total) by mouth 2 (two) times daily. 60 capsule 0     Ferrous Sulfate 325 (65 Fe) MG Oral Tab Take 1 tablet (325 mg total) by mouth daily with breakfast. 30 tablet 0     oxyCODONE 5 MG Oral Tab Take 1 tablet (5 mg total) by mouth every 6 (six) hours as needed for Pain. 28 tablet 0     sennosides-docusate (SENNA S) 8.6-50 MG Oral Tab Take 1 tablet by mouth daily. 30 tablet 0    [4]   Current Facility-Administered Medications Ordered in Epic   Medication Dose Route Frequency Provider Last Rate Last Admin    lactated ringers infusion   Intravenous Continuous Tania Barrera MD        acetaminophen (Tylenol Extra Strength) tab 1,000 mg  1,000 mg Oral Once Tania Barrera MD        ceFAZolin (Ancef) 2g in 10mL IV syringe premix  2 g Intravenous Once Tania Barrera MD        clonidine-EPINEPHrine-ropivacaine-ketorolac (CERTS) (Duraclon-Adrenalin-Naropin-Toradol) pain cocktail irrigation   Intra-articular Once (Intra-Op) Tania Barrera MD         No current [facility]-ordered outpatient medications on file.   [5] No Known Allergies  [6]   Family History  Problem Relation Age of Onset    Cancer Father 50        pancreatic    Other (Other) Father 50        pancreatic    Diabetes Mother     Hypertension Maternal Grandmother     Other (Other) Maternal Grandmother 87        uterine    Cancer Maternal Grandfather 67        pancreatic ca    Other (Other) Paternal Grandfather 60        pancreatic    Ovarian Cancer Maternal Aunt 50    Diabetes Maternal Aunt     Diabetes Other    [7]   Social History  Socioeconomic History    Marital status: Single   Tobacco Use    Smoking status: Former     Current packs/day: 0.00     Types: Cigarettes     Quit date: 6/28/2025      Years since quittin.0    Smokeless tobacco: Never   Vaping Use    Vaping status: Former    Substances: Nicotine   Substance and Sexual Activity    Alcohol use: Yes     Alcohol/week: 2.0 standard drinks of alcohol     Types: 2 Cans of beer per week     Comment: 2 beers and 2 margaritas occasionally    Drug use: Never    Sexual activity: Yes     Birth control/protection: Condom   Other Topics Concern    Caffeine Concern Yes     Comment: soda, coffee, 32 oz daily    Exercise Yes     Comment: PT

## 2025-07-28 NOTE — ANESTHESIA PROCEDURE NOTES
Spinal Block    Date/Time: 7/28/2025 7:35 AM    Performed by: Yessenia Fontaine CRNA  Authorized by: Apolinar Wilson MD      General Information and Staff    Start Time:  7/28/2025 7:35 AM  End Time:  7/28/2025 7:40 AM  CRNA:  Yessenia Fontaine CRNA  Performed by:  CRNA  Patient Location:  OR  Preanesthetic Checklist: patient identified, IV checked, risks and benefits discussed, monitors and equipment checked, pre-op evaluation, timeout performed, anesthesia consent and sterile technique used      Procedure Details    Patient Position:  Sitting  Prep: Betadine and ChloraPrep    Monitoring:  Cardiac monitor  Approach:  Midline  Location:  L2-3  Injection Technique:  Single-shot    Needle    Needle Type:  Pencil-tip  Needle Gauge:  24 G  Needle Length:  4 in    Assessment    Sensory Level:  T8  Events: clear CSF, CSF aspirated, well tolerated and blood negative      Additional Comments     Pt sitting on stretcher for spinal. Standard monitors on. All alarms audible. 1% skin wheal @ L3-L4. Easy placement of introducer, unsuccessfull attempt by KEYONA Fontaine. 1% skin wheal @ L2-L3, easy placement of introducer. Parasthesia noted on right side- repositioned. Erlinda Castro CRNA- 4inch Pencan x 1 attempt @ L2-L3.. + CSF, - parasthesia, - heme. Easy placement of 3ml 2% Mepivicaine, Everything removed from back- pt lady supine. VSS. T8 Level

## 2025-07-28 NOTE — ANESTHESIA POSTPROCEDURE EVALUATION
Patient: Radha Guallpa    Procedure Summary       Date: 07/28/25 Room / Location: [facility] MAIN OR  / [facility] MAIN OR    Anesthesia Start: 0728 Anesthesia Stop: 0939    Procedure: Right anterior total hip arthroplasty (Right: Hip) Diagnosis:       Osteoarthritis of right hip, unspecified osteoarthritis type      (Osteoarthritis of right hip, unspecified osteoarthritis type [M16.11])    Surgeons: Tania Barrera MD Anesthesiologist: Apolinar Wilson MD    Anesthesia Type: MAC, spinal ASA Status: 3            Anesthesia Type: MAC, spinal    Vitals Value Taken Time   /72 07/28/25 09:37   Temp 97.1 °F (36.2 °C) 07/28/25 09:39   Pulse 77 07/28/25 09:38   Resp 10 07/28/25 09:38   SpO2 96 % 07/28/25 09:38   Vitals shown include unfiled device data.    EM AN Post Evaluation:   Patient Evaluated in PACU  Patient Participation: complete - patient participated  Level of Consciousness: awake and alert  Pain Score: 2  Pain Management: adequate  Airway Patency:  Dental exam unchanged from preop  Yes    Nausea/Vomiting: none  Cardiovascular Status: acceptable  Respiratory Status: acceptable  Postoperative Hydration acceptable      Yessenia DIAN Fontaine  7/28/2025 9:39 AM

## 2025-08-19 ENCOUNTER — HOSPITAL ENCOUNTER (OUTPATIENT)
Dept: GENERAL RADIOLOGY | Facility: HOSPITAL | Age: 50
Discharge: HOME | End: 2025-08-19
Attending: ORTHOPAEDIC SURGERY

## 2025-08-19 ENCOUNTER — OFFICE VISIT (OUTPATIENT)
Facility: CLINIC | Age: 50
End: 2025-08-19

## 2025-08-19 DIAGNOSIS — Z47.89 ORTHOPEDIC AFTERCARE: ICD-10-CM

## 2025-08-19 DIAGNOSIS — Z96.641 S/P HIP REPLACEMENT, RIGHT: Primary | ICD-10-CM

## 2025-08-19 PROCEDURE — 99024 POSTOP FOLLOW-UP VISIT: CPT | Performed by: ORTHOPAEDIC SURGERY

## 2025-08-19 PROCEDURE — 73502 X-RAY EXAM HIP UNI 2-3 VIEWS: CPT | Performed by: ORTHOPAEDIC SURGERY

## (undated) DEVICE — SOLUTION IRRIG 3000ML 0.9% NACL FLX CONT

## (undated) DEVICE — PAD PERINL PST FOAM DISP FOR AMSCO SURG TBL

## (undated) DEVICE — BLADE 11 SHRP BP SS SRG STRL

## (undated) DEVICE — 6 ML SYRINGE LUER-LOCK TIP: Brand: MONOJECT

## (undated) DEVICE — NDLCTR: FOAM/ADHESIVE 10CT 96/CS: Brand: MEDICAL ACTION INDUSTRIES

## (undated) DEVICE — TRAY PREP WET SKIN 4 COMPARTMENT 6 SPNG 2 TWL

## (undated) DEVICE — SYRINGE MED 60ML 5ML INCREMENT CLR BRL BLK

## (undated) DEVICE — GLOVE SUR 7.5 SENSICARE PIP WHT PWD F

## (undated) DEVICE — D AND C PACK: Brand: MEDLINE INDUSTRIES, INC.

## (undated) DEVICE — DEVICE SPEC RTRVL MYOSURE

## (undated) DEVICE — HYSTEROSCOPY: Brand: MEDLINE INDUSTRIES, INC.

## (undated) DEVICE — SUT VCRL 2-0 36IN CT-1 ABSRB UD L36MM 1/2 CIR

## (undated) DEVICE — SUTURE VICRYL 0 J340H

## (undated) DEVICE — DRESSING SUR 9X25CM SIL SP CVR WTRPRF VIR

## (undated) DEVICE — APPLICATOR PREP 26ML CHG 2% ISO ALC 70%

## (undated) DEVICE — SOCK CNSTR 4IN TNPSL UNV SPEC

## (undated) DEVICE — SEALER BPLR ELECTRD 3.48X5.74MM CNTR

## (undated) DEVICE — VIOLET BRAIDED (POLYGLACTIN 910), SYNTHETIC ABSORBABLE SUTURE: Brand: COATED VICRYL

## (undated) DEVICE — SOL  .9 1000ML BTL

## (undated) DEVICE — COTTON BALLS: Brand: DEROYAL

## (undated) DEVICE — SUT VCRL 1-0 36IN CTX ABSRB UD L48MM 1/2 CIR

## (undated) DEVICE — POOLE SUCTION INSTRUMENT WITH REMOVABLE SHEATH: Brand: POOLE

## (undated) DEVICE — PENCIL ESURG 10FT 3/32IN SS

## (undated) DEVICE — POUCH IRRIG 19X23IN TRNSLUC MATTE FNSH

## (undated) DEVICE — ENCORE® LATEX ACCLAIM SIZE 6.5, STERILE LATEX POWDER-FREE SURGICAL GLOVE: Brand: ENCORE

## (undated) DEVICE — NEEDLE SPNL 18GA L3.5IN W/ QNCKE SHARPER BVL

## (undated) DEVICE — SUCTION CANISTER, 3000CC,SAFELINER: Brand: DEROYAL

## (undated) DEVICE — STANDARD HYPODERMIC NEEDLE,POLYPROPYLENE HUB: Brand: MONOJECT

## (undated) DEVICE — HOOD STERI-SHIELD 408-800-000

## (undated) DEVICE — ENCORE® LATEX MICRO SIZE 6.5, STERILE LATEX POWDER-FREE SURGICAL GLOVE: Brand: ENCORE

## (undated) DEVICE — SYRINGE MNJCT 35ML LF STRL LL

## (undated) DEVICE — MYOSURE SINGLE USE SEAL SET

## (undated) DEVICE — SET TUBI Y FL CNTRL INFL/OTFL

## (undated) DEVICE — SOLUTION IRRIG 1000ML 0.9% NACL USP BTL

## (undated) DEVICE — CAUTERY TIP BLADE EXTENSION

## (undated) DEVICE — GLOVE SUR 8 SENSICARE PIP WHT PWD F

## (undated) DEVICE — Device

## (undated) DEVICE — NEEDLE 18G 1-1/2 BLUNT FILL

## (undated) DEVICE — SOL  .9 3000ML

## (undated) DEVICE — PACK CDS ANTERIOR HIP

## (undated) DEVICE — 12 ML SYRINGE LUER-LOCK TIP: Brand: MONOJECT

## (undated) DEVICE — ELECTRODE LOOP WHITE

## (undated) DEVICE — SUT MCRYL 3-0 27IN ABSRB UD L24MM PS-1

## (undated) DEVICE — SUT ETHLN 2-0 18IN FS NABSRB BLK 26MM 3/8 CIR

## (undated) DEVICE — HOOD STERI-SHIELD 408-800-100

## (undated) DEVICE — MEDI-VAC NON-CONDUCTIVE SUCTION TUBING: Brand: CARDINAL HEALTH

## (undated) DEVICE — SUT ETHBND XL 2 30IN V-37 NABSRB GRN 40MM 1/2

## (undated) DEVICE — APPLICATOR COTTON TIP 6\" 2/PK

## (undated) DEVICE — STERILE LATEX POWDER-FREE SURGICAL GLOVESWITH NITRILE COATING: Brand: PROTEXIS

## (undated) DEVICE — HANDPIECE IRRIG BTTRY OPERATED TYP W/ SUCT HI

## (undated) DEVICE — NEEDLE SPINAL 22X3-1/2 BLK

## (undated) NOTE — MR AVS SNAPSHOT
[place]  [address]  [phone]               Thank you for choosing us for your health care visit with Boogie Landrum MD.  We are glad to serve you and happy to provide you with this summary of If you have questions, you can call [phone] to talk to our [facility] Staff. Remember, [employer] is NOT to be used for urgent needs. For medical emergencies, dial 911.            Visit [facility] online at  [email or web address]

## (undated) NOTE — LETTER
MINOR CASE LETTER      3/8/2022        Dear Darian Oconnell,    Your are having a Hysteroscopy with Myosure and Dilation and Curettage on Monday,2022 at 10:00am at [facility].    Do not eat or drink anything (including water) after midnight the night before surgery. A prescription called Cytotec has been sent to your pharmacy, you must take it at bedtime the night before surgery. If your procedure is scheduled later in the day, then nothing by mouth for 6 hours before arrival to the  hospital.    Otis Estrella are to call this office if you have any cold or flu symptoms 2 days before your scheduled surgery. Please avoid ALL aspirin and herbal supplements 7 days before surgery. Avoid Ibuprofen, Motrin, Aleve, or Naprosyn for 3 days before surgery. Please avoid ALL Cannabis use 24 hours prior to surgery. You cannot wear hair pins,wigs,artificial nail or metallic nails/ nail polish for surgery. You will be contacted by PreAdmission Testing (PAT) usually within the week before surgery. They will take a short medical history and let you know if any preoperative testing is needed. If you have any questions for preadmission testing please contact them directly by calling [phone]. In accordance with IDPH guidelines we must ask that you self-quarantine as best as possible until the day of your surgical/non-surgical procedure once you have been tested for   COVID( testing is performed with 72 hours of the scheduled procedure). It is important to take precautions against the spread of COVID-19 disease both before   and after your surgical procedure. We ask that you adhere to the followin. Avoid crowds  2. Wear a mask or face covering in public  3. Maintain social distancing  4. Practice good hygiene    I contacted your insurance and was advised no prior authorization is needed for surgery. Please make arrangements for someone to drive you home after your surgery.     Call our office now to schedule your post-operative appointment for 2 weeks after surgery. Please feel free to contact our office at  if you have any questions regarding these instructions or your procedure.       Sincerely,          Junior Que MD  [address] [place] [id number]  [phone]    Document electronically generated by:  Mia Weber

## (undated) NOTE — LETTER
8/3/2017              [address] [facility]         Dear Linden Alexander,    We had informed you previously that you needed to do a repeat left diagnostic mammogram in 6 months, it was due 5/2017.       Scheduling teresa

## (undated) NOTE — LETTER
6/24/2025                                                                           Radha Guallpa is scheduled for outpatient surgery on July 28, 2025 at [facility] with Tania Foss MD. The procedure(s) being performed is/are: Right Anterior Total Hip Arthroplasty.        The patient has been advised to contact your office for pre-operative clearance. The patient needs the following test/exams (as marked):    __X___ History & Physical (should be no older that 30 days)    ___X__ MRSA (Valid for 30 Days)  __X___ Type & Screen (Only to be done at an [facility])  __X___ EKG (Valid for 1 year)  __X___ CBC & BMP (Valid for 3 months)   __X___ Urinalysis Reflex to culture (Valid for 30 days)  __X___ Hemoglobin A1c             Please include any other tests you deem necessary for medical clearance and inform us if you refer patient to any specialist for presurgical clearance. Please fax these results to our office at [phone], as well as the pre-op department at [phone].  If you have any questions regarding this case, please contact our office at [phone]. Thank you for your assistance with this matter.

## (undated) NOTE — IP AVS SNAPSHOT
[facility]    [address], [place] ~ [phone]                Discharge Summary   2/7/2017    Jose De La Rosa           Admission Information        Provider Department    2/7/2017 Boogie Landrum MD [facility] Pre-Op Igor · To experience mild discomforts such as sore lip or tongue, headache, cramps, gas pains or a bloated feeling in your abdomen. · To experience mild back pain or soreness for a day or two if you had spinal or epidural anesthesia.    · If you had laparoscop Contact information:    [address] [place] [address]  [address]  [phone]        Future Appointments        Provider Department    10/20/2017 2:00 PM Michael Contreras [facility], [address], [employer] History as not sign up before the expiration date, you must request a new code. Your unique [employer] Access Code: [email or web address]  Expires: 4/8/2017 11:55 AM    If you have questions, you can call [phone] to talk to our [facility] Staff.  Remember, Gely i

## (undated) NOTE — LETTER
AUTHORIZATION FOR SURGICAL OPERATION OR OTHER PROCEDURE    1. I hereby authorize Dr. Dee Maza, and [employer] [facility] staff assigned to my case to perform the following operation and/or procedure at the [employer] [facility], [facility]:    _______________ENDOMETRIAL BIOPSY________________________________________________________________________________      _______________________________________________________________________________________________    2. My physician has explained the nature and purpose of the operation or other procedure, possible alternative methods of treatment, the risks involved, and the possibility of complication to me. I acknowledge that no guarantee has been made as to the result that may be obtained. 3.  I recognize that, during the course of this operation, or other procedure, unforseen conditions may necessitate additional or different procedure than those listed above. I, therefore, further authorize and request that the above named physician, his/her physician assistants or designees perform such procedures as are, in his/her professional opinion, necessary and desirable. 4.  Any tissue or organs removed in the operation or other procedure may be disposed of by and at the discretion of the [employer] [facility] and [address]. 5.  I understand that in the event of a medical emergency, I will be transported by local paramedics to [facility] or other [facility] emergency department. 6.  I certify that I have read and fully understand the above consent to operation and/or other procedure. 7.  I acknowledge that my physician has explained sedation/analgesia administration to me including the risks and benefits. I consent to the administration of sedation/analgesia as may be necessary or desirable in the judgement of my physician.     Witness signature: ___________________________________________________ Date:  ______/______/_____                    Time:  ________ A.M.  P.M. Patient Name:  ______________________________________________________  (please print)      Patient signature:  ___________________________________________________             Relationship to Patient:           []  Parent    Responsible person                          []  Spouse  In case of minor or                    [] Other  _____________   Incompetent name:  __________________________________________________                               (please print)      _____________      Responsible person  In case of minor or  Incompetent signature:  _______________________________________________    Statement of Physician  My signature below affirms that prior to the time of the procedure, I have explained to the patient and/or his/her guardian, the risks and benefits involved in the proposed treatment and any reasonable alternative to the proposed treatment. I have also explained the risks and benefits involved in the refusal of the proposed treatment and have answered the patient's questions.                         Date:  ______/______/_______  Provider                      Signature:  __________________________________________________________       Time:  ___________ A.M    P.M.

## (undated) NOTE — LETTER
AUTHORIZATION FOR SURGICAL OPERATION OR OTHER PROCEDURE    1.  I hereby authorize Dr. Rebecca Lundborg and the [facility] staff assigned to my case to perform the following operation and/or procedure at the [facility] Office:    Ultrasound guided CSI bilateral hips  ________ Time:  ________ A. M.  P.M.        Patient Name:  ______________________________________________________  (please print)       Patient signature:  ___________________________________________________             Relationship to Patient: